# Patient Record
Sex: FEMALE | Race: WHITE | NOT HISPANIC OR LATINO | Employment: UNEMPLOYED | URBAN - METROPOLITAN AREA
[De-identification: names, ages, dates, MRNs, and addresses within clinical notes are randomized per-mention and may not be internally consistent; named-entity substitution may affect disease eponyms.]

---

## 2017-01-11 ENCOUNTER — HOSPITAL ENCOUNTER (OUTPATIENT)
Dept: RADIOLOGY | Facility: HOSPITAL | Age: 67
Discharge: HOME | End: 2017-01-11
Attending: NURSE PRACTITIONER
Payer: COMMERCIAL

## 2017-01-11 ENCOUNTER — OFFICE VISIT (OUTPATIENT)
Dept: FAMILY MEDICINE | Facility: CLINIC | Age: 67
End: 2017-01-11
Payer: COMMERCIAL

## 2017-01-11 VITALS
RESPIRATION RATE: 18 BRPM | SYSTOLIC BLOOD PRESSURE: 145 MMHG | OXYGEN SATURATION: 95 % | BODY MASS INDEX: 34.89 KG/M2 | WEIGHT: 189.63 LBS | DIASTOLIC BLOOD PRESSURE: 79 MMHG | HEART RATE: 66 BPM | HEIGHT: 62 IN | TEMPERATURE: 98 F

## 2017-01-11 DIAGNOSIS — R60.0 FACIAL EDEMA: ICD-10-CM

## 2017-01-11 DIAGNOSIS — K04.7 DENTAL ABSCESS: ICD-10-CM

## 2017-01-11 DIAGNOSIS — K02.9 PAIN DUE TO DENTAL CARIES: ICD-10-CM

## 2017-01-11 DIAGNOSIS — R60.0 FACIAL EDEMA: Primary | ICD-10-CM

## 2017-01-11 PROCEDURE — 1159F MED LIST DOCD IN RCRD: CPT | Mod: S$GLB,,, | Performed by: NURSE PRACTITIONER

## 2017-01-11 PROCEDURE — 3078F DIAST BP <80 MM HG: CPT | Mod: S$GLB,,, | Performed by: NURSE PRACTITIONER

## 2017-01-11 PROCEDURE — 3077F SYST BP >= 140 MM HG: CPT | Mod: S$GLB,,, | Performed by: NURSE PRACTITIONER

## 2017-01-11 PROCEDURE — 70150 X-RAY EXAM OF FACIAL BONES: CPT | Mod: TC,PO

## 2017-01-11 PROCEDURE — 99213 OFFICE O/P EST LOW 20 MIN: CPT | Mod: S$GLB,,, | Performed by: NURSE PRACTITIONER

## 2017-01-11 PROCEDURE — 99999 PR PBB SHADOW E&M-EST. PATIENT-LVL IV: CPT | Mod: PBBFAC,,, | Performed by: NURSE PRACTITIONER

## 2017-01-11 PROCEDURE — 70150 X-RAY EXAM OF FACIAL BONES: CPT | Mod: 26,,, | Performed by: RADIOLOGY

## 2017-01-11 RX ORDER — CHLORHEXIDINE GLUCONATE ORAL RINSE 1.2 MG/ML
15 SOLUTION DENTAL 2 TIMES DAILY
Qty: 118 ML | Refills: 0 | Status: SHIPPED | OUTPATIENT
Start: 2017-01-11 | End: 2017-01-25

## 2017-01-11 RX ORDER — AMOXICILLIN AND CLAVULANATE POTASSIUM 875; 125 MG/1; MG/1
1 TABLET, FILM COATED ORAL 2 TIMES DAILY
Qty: 20 TABLET | Refills: 0 | Status: SHIPPED | OUTPATIENT
Start: 2017-01-11 | End: 2017-01-21

## 2017-01-11 RX ORDER — METRONIDAZOLE 500 MG/1
500 TABLET ORAL 3 TIMES DAILY
Qty: 30 TABLET | Refills: 0 | Status: SHIPPED | OUTPATIENT
Start: 2017-01-11 | End: 2017-01-21

## 2017-01-11 RX ORDER — OXYCODONE AND ACETAMINOPHEN 5; 325 MG/1; MG/1
1 TABLET ORAL EVERY 8 HOURS PRN
Qty: 20 TABLET | Refills: 0 | Status: SHIPPED | OUTPATIENT
Start: 2017-01-11 | End: 2017-04-19

## 2017-01-11 NOTE — PROGRESS NOTES
Subjective:       Patient ID: Ruthann Swenson is a 66 y.o. female.    Chief Complaint: Dental Pain  Pt reports to clinic with chief complaint of dental pain and facial edema.  Dental pain started approx 2 days ago.  Pt reports awakening with facial edema to the left cheek.  Denies swelling to lips or tongue. No difficulty breathing.  Reports she is supposed to get teeth pulled soon to begin process of getting dentures.  Denies fever.   Dental Pain    This is a new problem. The current episode started yesterday. The problem occurs constantly. The problem has been unchanged. The pain is at a severity of 10/10. The pain is severe. Associated symptoms include facial pain. Pertinent negatives include no difficulty swallowing. She has tried nothing for the symptoms.     Review of Systems   Constitutional: Negative.    HENT: Positive for dental problem and facial swelling.    Respiratory: Negative for shortness of breath and wheezing.    Cardiovascular: Negative.    Psychiatric/Behavioral: Negative.        Objective:      Physical Exam   Constitutional: She is oriented to person, place, and time. She appears well-developed and well-nourished.   HENT:   Head: Normocephalic.   Right Ear: Tympanic membrane normal.   Left Ear: Tympanic membrane normal.   Mouth/Throat: Dental abscesses and dental caries present. No tonsillar abscesses.       Tender to touch, several caries noted.; edema noted to left jaw, no periorbital edema;    Eyes: EOM are normal. Pupils are equal, round, and reactive to light.   Neck: Neck supple.   Cardiovascular: Normal rate and normal heart sounds.    Pulmonary/Chest: Effort normal and breath sounds normal.   Lymphadenopathy:     She has no cervical adenopathy.   Neurological: She is alert and oriented to person, place, and time.   Skin: Skin is warm and dry.   Psychiatric: She has a normal mood and affect.   Vitals reviewed.      Assessment:       1. Facial edema    2. Dental abscess    3.  Pain due to dental caries        Plan:   Facial edema  -     amoxicillin-clavulanate 875-125mg (AUGMENTIN) 875-125 mg per tablet; Take 1 tablet by mouth 2 (two) times daily.  Dispense: 20 tablet; Refill: 0  -     metronidazole (FLAGYL) 500 MG tablet; Take 1 tablet (500 mg total) by mouth 3 (three) times daily.  Dispense: 30 tablet; Refill: 0  -     X-Ray Facial Bones  3 Or More View; Future; Expected date: 1/11/17  -     oxycodone-acetaminophen (PERCOCET) 5-325 mg per tablet; Take 1 tablet by mouth every 8 (eight) hours as needed for Pain.  Dispense: 20 tablet; Refill: 0    Dental abscess  -     chlorhexidine (PERIDEX) 0.12 % solution; Use as directed 15 mLs in the mouth or throat 2 (two) times daily.  Dispense: 118 mL; Refill: 0  -     amoxicillin-clavulanate 875-125mg (AUGMENTIN) 875-125 mg per tablet; Take 1 tablet by mouth 2 (two) times daily.  Dispense: 20 tablet; Refill: 0  -     metronidazole (FLAGYL) 500 MG tablet; Take 1 tablet (500 mg total) by mouth 3 (three) times daily.  Dispense: 30 tablet; Refill: 0  -     X-Ray Facial Bones  3 Or More View; Future; Expected date: 1/11/17  -     oxycodone-acetaminophen (PERCOCET) 5-325 mg per tablet; Take 1 tablet by mouth every 8 (eight) hours as needed for Pain.  Dispense: 20 tablet; Refill: 0  -pt instructed to follow up with dentist STAT.  States she will notify dentist that is pulling her teeth soon as she gets home.  Educated patient on worsening symptoms such as airway edema; verbalized understanding.   Pain due to dental caries  -     chlorhexidine (PERIDEX) 0.12 % solution; Use as directed 15 mLs in the mouth or throat 2 (two) times daily.  Dispense: 118 mL; Refill: 0  -     oxycodone-acetaminophen (PERCOCET) 5-325 mg per tablet; Take 1 tablet by mouth every 8 (eight) hours as needed for Pain.  Dispense: 20 tablet; Refill: 0      No Follow-up on file.

## 2017-01-11 NOTE — MR AVS SNAPSHOT
[facility]  [address]  [facility] [address]  Phone: [phone]  Fax: [phone]                  Ruthann Swenson   2017 10:20 AM   Office Visit    Description:  Female : 1950   Provider:  Loyda Alberts NP   Department:  [facility]           Reason for Visit     Dental Pain           Diagnoses this Visit        Comments    Facial edema    -  Primary     Dental abscess         Pain due to dental caries                To Do List           Future Appointments        Provider Department Dept Phone    2017 11:45 AM [facility] XR1 [facility] [phone]      Goals (5 Years of Data)     None       These Medications        Disp Refills Start End    chlorhexidine (PERIDEX) 0.12 % solution 118 mL 0 2017    Use as directed 15 mLs in the mouth or throat 2 (two) times daily. - Mouth/Throat    Pharmacy: [address] [place] - [address] [facility] #: [phone]       amoxicillin-clavulanate 875-125mg (AUGMENTIN) 875-125 mg per tablet 20 tablet 0 2017    Take 1 tablet by mouth 2 (two) times daily. - Oral    Pharmacy: [address] [place], [place] - [address] [facility] #: [phone]       metronidazole (FLAGYL) 500 MG tablet 30 tablet 0 2017    Take 1 tablet (500 mg total) by mouth 3 (three) times daily. - Oral    Pharmacy: [address] [place], [place] - [address] [facility] #: [phone]       oxycodone-acetaminophen (PERCOCET) 5-325 mg per tablet 20 tablet 0 2017     Take 1 tablet by mouth every 8 (eight) hours as needed for Pain. - Oral    Pharmacy: [address] [place], [place] - [address] [facility] #: [phone]         [facility]     [facility] On Call Nurse Care Line -  Assistance  Registered nurses in the [facility] On Call Center provide clinical advisement, health education, appointment booking, and other  advisory services.  Call for this free service at [phone].             Medications           Message regarding Medications     Verify the changes and/or additions to your medication regime listed below are the same as discussed with your clinician today.  If any of these changes or additions are incorrect, please notify your healthcare provider.        START taking these NEW medications        Refills    chlorhexidine (PERIDEX) 0.12 % solution 0    Sig: Use as directed 15 mLs in the mouth or throat 2 (two) times daily.    Class: Normal    Route: Mouth/Throat    amoxicillin-clavulanate 875-125mg (AUGMENTIN) 875-125 mg per tablet 0    Sig: Take 1 tablet by mouth 2 (two) times daily.    Class: Normal    Route: Oral    metronidazole (FLAGYL) 500 MG tablet 0    Sig: Take 1 tablet (500 mg total) by mouth 3 (three) times daily.    Class: Normal    Route: Oral    oxycodone-acetaminophen (PERCOCET) 5-325 mg per tablet 0    Sig: Take 1 tablet by mouth every 8 (eight) hours as needed for Pain.    Class: Print    Route: Oral           Verify that the below list of medications is an accurate representation of the medications you are currently taking.  If none reported, the list may be blank. If incorrect, please contact your healthcare provider. Carry this list with you in case of emergency.           Current Medications     albuterol 90 mcg/actuation inhaler Inhale 2 puffs into the lungs every 6 (six) hours as needed for Wheezing.    carvedilol (COREG) 12.5 MG tablet Take 1 tablet (12.5 mg total) by mouth 2 (two) times daily.    cetirizine (ZYRTEC) 10 MG tablet Take 10 mg by mouth once daily.    fluocinolone (VANOS) 0.01 % cream Apply topically 2 (two) times daily.    fluticasone (FLONASE) 50 mcg/actuation nasal spray 2 sprays by Each Nare route once daily.    levothyroxine (SYNTHROID) 125 MCG tablet Take 1 tablet (125 mcg total) by mouth once daily.    omeprazole (PRILOSEC) 20 MG capsule Take 1 capsule (20 mg total) by  "mouth once daily.    amoxicillin-clavulanate 875-125mg (AUGMENTIN) 875-125 mg per tablet Take 1 tablet by mouth 2 (two) times daily.    chlorhexidine (PERIDEX) 0.12 % solution Use as directed 15 mLs in the mouth or throat 2 (two) times daily.    metronidazole (FLAGYL) 500 MG tablet Take 1 tablet (500 mg total) by mouth 3 (three) times daily.    oxycodone-acetaminophen (PERCOCET) 5-325 mg per tablet Take 1 tablet by mouth every 8 (eight) hours as needed for Pain.           Clinical Reference Information           Vital Signs - Last Recorded  Most recent update: 1/11/2017 10:37 AM by Jaci Jha LPN    BP Pulse Temp Resp Ht Wt    (!) 145/79 66 98.1 °F (36.7 °C) (Tympanic) 18 5' 2" (1.575 m) 86 kg (189 lb 9.5 oz)    SpO2 BMI             95% 34.68 kg/m2         Blood Pressure          Most Recent Value    BP  (!)  145/79      Allergies as of 1/11/2017     Allegra [Fexofenadine]    Lortab [Hydrocodone-acetaminophen]    Prednisone      Immunizations Administered on Date of Encounter - 1/11/2017     None      Orders Placed During Today's Visit     Future Labs/Procedures Expected by Expires    X-Ray Facial Bones  3 Or More View  1/11/2017 1/11/2018      "

## 2017-02-22 DIAGNOSIS — I10 ESSENTIAL HYPERTENSION: ICD-10-CM

## 2017-02-22 RX ORDER — CARVEDILOL 12.5 MG/1
12.5 TABLET ORAL 2 TIMES DAILY
Qty: 60 TABLET | Refills: 2 | Status: SHIPPED | OUTPATIENT
Start: 2017-02-22 | End: 2017-06-27 | Stop reason: SDUPTHER

## 2017-02-22 NOTE — TELEPHONE ENCOUNTER
Last office visit 01/11/2017. Last refill date 01/15/2017. Pt is requesting a refill on coreg 12.5 mg #60 bid.

## 2017-04-19 ENCOUNTER — OFFICE VISIT (OUTPATIENT)
Dept: FAMILY MEDICINE | Facility: CLINIC | Age: 67
End: 2017-04-19
Payer: COMMERCIAL

## 2017-04-19 ENCOUNTER — HOSPITAL ENCOUNTER (OUTPATIENT)
Dept: RADIOLOGY | Facility: HOSPITAL | Age: 67
Discharge: HOME | End: 2017-04-19
Attending: FAMILY MEDICINE
Payer: COMMERCIAL

## 2017-04-19 VITALS
HEART RATE: 80 BPM | DIASTOLIC BLOOD PRESSURE: 72 MMHG | BODY MASS INDEX: 34.22 KG/M2 | TEMPERATURE: 98 F | HEIGHT: 62 IN | SYSTOLIC BLOOD PRESSURE: 118 MMHG | WEIGHT: 185.94 LBS | OXYGEN SATURATION: 94 %

## 2017-04-19 DIAGNOSIS — M25.462 SWELLING OF KNEE JOINT, LEFT: Primary | ICD-10-CM

## 2017-04-19 DIAGNOSIS — E78.5 DM TYPE 2 WITH DIABETIC DYSLIPIDEMIA: ICD-10-CM

## 2017-04-19 DIAGNOSIS — E11.69 DM TYPE 2 WITH DIABETIC DYSLIPIDEMIA: ICD-10-CM

## 2017-04-19 DIAGNOSIS — M25.462 SWELLING OF KNEE JOINT, LEFT: ICD-10-CM

## 2017-04-19 PROCEDURE — 99999 PR PBB SHADOW E&M-EST. PATIENT-LVL III: CPT | Mod: PBBFAC,,, | Performed by: FAMILY MEDICINE

## 2017-04-19 PROCEDURE — 99214 OFFICE O/P EST MOD 30 MIN: CPT | Mod: S$GLB,,, | Performed by: FAMILY MEDICINE

## 2017-04-19 PROCEDURE — 3078F DIAST BP <80 MM HG: CPT | Mod: S$GLB,,, | Performed by: FAMILY MEDICINE

## 2017-04-19 PROCEDURE — 73560 X-RAY EXAM OF KNEE 1 OR 2: CPT | Mod: TC,PO,RT

## 2017-04-19 PROCEDURE — 73560 X-RAY EXAM OF KNEE 1 OR 2: CPT | Mod: 26,RT,, | Performed by: RADIOLOGY

## 2017-04-19 PROCEDURE — 3060F POS MICROALBUMINURIA REV: CPT | Mod: 8P,S$GLB,, | Performed by: FAMILY MEDICINE

## 2017-04-19 PROCEDURE — 1160F RVW MEDS BY RX/DR IN RCRD: CPT | Mod: S$GLB,,, | Performed by: FAMILY MEDICINE

## 2017-04-19 PROCEDURE — 73562 X-RAY EXAM OF KNEE 3: CPT | Mod: 26,LT,, | Performed by: RADIOLOGY

## 2017-04-19 PROCEDURE — 3074F SYST BP LT 130 MM HG: CPT | Mod: S$GLB,,, | Performed by: FAMILY MEDICINE

## 2017-04-19 PROCEDURE — 3045F PR MOST RECENT HEMOGLOBIN A1C LEVEL 7.0-9.0%: CPT | Mod: S$GLB,,, | Performed by: FAMILY MEDICINE

## 2017-04-19 RX ORDER — MULTIVITAMIN
1 TABLET ORAL DAILY
COMMUNITY

## 2017-04-19 NOTE — MR AVS SNAPSHOT
[facility]  [address]  [facility] [address]  Phone: [phone]  Fax: [phone]                  Ruthann Swenson   2017 2:20 PM   Office Visit    Description:  Female : 1950   Provider:  Gifty Rivera MD   Department:  [facility]           Reason for Visit     Knee Pain           Diagnoses this Visit        Comments    Swelling of knee joint, left    -  Primary     DM type 2 with diabetic dyslipidemia                To Do List           Future Appointments        Provider Department Dept Phone    2017 3:30 PM [facility] XR1 [facility] [phone]      Goals (5 Years of Data)     None      [facility] On Call     [facility] Nurse Care Line -  Assistance  Unless otherwise directed by your provider, please contact [facility], our nurse care line that is available for  assistance.     Registered nurses in the [facility] Center provide: appointment scheduling, clinical advisement, health education, and other advisory services.  Call: [phone] (toll free)               Medications           Message regarding Medications     Verify the changes and/or additions to your medication regime listed below are the same as discussed with your clinician today.  If any of these changes or additions are incorrect, please notify your healthcare provider.        STOP taking these medications     oxycodone-acetaminophen (PERCOCET) 5-325 mg per tablet Take 1 tablet by mouth every 8 (eight) hours as needed for Pain.           Verify that the below list of medications is an accurate representation of the medications you are currently taking.  If none reported, the list may be blank. If incorrect, please contact your healthcare provider. Carry this list with you in case of emergency.           Current Medications     albuterol 90 mcg/actuation inhaler Inhale 2 puffs into the lungs every 6 (six) hours as  "needed for Wheezing.    carvedilol (COREG) 12.5 MG tablet Take 1 tablet (12.5 mg total) by mouth 2 (two) times daily.    cetirizine (ZYRTEC) 10 MG tablet Take 10 mg by mouth once daily.    fluticasone (FLONASE) 50 mcg/actuation nasal spray 2 sprays by Each Nare route once daily.    levothyroxine (SYNTHROID) 125 MCG tablet Take 1 tablet (125 mcg total) by mouth once daily.    multivitamin (ONE DAILY MULTIVITAMIN) per tablet Take 1 tablet by mouth once daily.    fluocinolone (VANOS) 0.01 % cream Apply topically 2 (two) times daily.    omeprazole (PRILOSEC) 20 MG capsule Take 1 capsule (20 mg total) by mouth once daily.           Clinical Reference Information           Your Vitals Were     BP Pulse Temp Height Weight SpO2    118/72 (BP Location: Left arm, Patient Position: Sitting, BP Method: Manual) 80 98.2 °F (36.8 °C) (Oral) 5' 2" (1.575 m) 84.4 kg (185 lb 15.3 oz) 94%    BMI                34.01 kg/m2          Blood Pressure          Most Recent Value    BP  118/72      Allergies as of 4/19/2017     Allegra [Fexofenadine]    Lortab [Hydrocodone-acetaminophen]    Prednisone      Immunizations Administered on Date of Encounter - 4/19/2017     None      Orders Placed During Today's Visit     Future Labs/Procedures Expected by Expires    CBC auto differential  4/19/2017 7/18/2017    Hemoglobin A1c  4/19/2017 6/18/2018    Sedimentation rate, manual  4/19/2017 7/18/2017    Uric acid  4/19/2017 7/18/2017    X-ray Knee Ortho Left  4/19/2017 4/19/2018      Language Assistance Services     ATTENTION: Language assistance services are available, free of charge. Please call [phone].      ATENCIÓN: Si debbiela rona, tiene a aguilar disposición servicios gratuitos de asistencia lingüística. Llame al [phone].     CHÚ Ý: N?u b?n nói Ti?ng Vi?t, có các d?ch v? h? tr? ngôn ng? mi?n phí dành cho b?n. G?i s? [phone].         [facility] complies with applicable Federal civil rights laws and does not " discriminate on the basis of race, color, national origin, age, disability, or sex.

## 2017-04-19 NOTE — PROGRESS NOTES
"Subjective:       Patient ID: Ruthann Swenson is a 66 y.o. female.    Chief Complaint: Knee Pain    HPI Comments: 66 y old female with L knee pain for 2 m , no injuries . She  states that 4 y ago she was trying to sit down and heard a " Squishing " sound and knee immediately swelled up . She had X ray done and she was told they were normal , now pain is excruciating . Mainly on medial aspect to L knee , worst when she turns knee and by stepping in certain kind of way , still swells up . She has not taken any med    Knee Pain        Review of Systems   Constitutional: Negative.    Respiratory: Negative.    Cardiovascular: Negative.    Gastrointestinal: Negative.    Musculoskeletal: Positive for arthralgias and joint swelling.       Objective:      Physical Exam   Constitutional: She is oriented to person, place, and time. She appears well-developed and well-nourished. No distress.   HENT:   Head: Normocephalic and atraumatic.   Right Ear: External ear normal.   Left Ear: External ear normal.   Mouth/Throat: No oropharyngeal exudate.   Eyes: Conjunctivae and EOM are normal. Pupils are equal, round, and reactive to light. Right eye exhibits no discharge. Left eye exhibits no discharge. No scleral icterus.   Neck: Normal range of motion. Neck supple. No JVD present. No tracheal deviation present. No thyromegaly present.   Cardiovascular: Normal rate, regular rhythm and normal heart sounds.  Exam reveals no gallop and no friction rub.    No murmur heard.  Pulmonary/Chest: Effort normal and breath sounds normal. No stridor. No respiratory distress. She has no wheezes. She has no rales. She exhibits no tenderness.   Abdominal: Soft. Bowel sounds are normal. She exhibits no distension. There is no tenderness. There is no rebound and no guarding.   Musculoskeletal:        Left knee: She exhibits decreased range of motion, swelling and effusion. Tenderness found. Medial joint line tenderness noted. "   Lymphadenopathy:     She has no cervical adenopathy.   Neurological: She is alert and oriented to person, place, and time.   Skin: Skin is warm and dry. She is not diaphoretic.   Psychiatric: She has a normal mood and affect. Her behavior is normal. Judgment and thought content normal.       Assessment:     Ruthann was seen today for knee pain.    Diagnoses and all orders for this visit:    Swelling of knee joint, left  -     X-ray Knee Ortho Left; Future  -     CBC auto differential; Future  -     Sedimentation rate, manual; Future  -     Uric acid; Future    DM type 2 with diabetic dyslipidemia  -     Hemoglobin A1c; Future      Plan:   We will cont to [facility]

## 2017-05-09 ENCOUNTER — LAB VISIT (OUTPATIENT)
Dept: LAB | Facility: HOSPITAL | Age: 67
End: 2017-05-09
Attending: FAMILY MEDICINE
Payer: COMMERCIAL

## 2017-05-09 ENCOUNTER — OFFICE VISIT (OUTPATIENT)
Dept: FAMILY MEDICINE | Facility: CLINIC | Age: 67
End: 2017-05-09
Payer: COMMERCIAL

## 2017-05-09 VITALS
OXYGEN SATURATION: 95 % | TEMPERATURE: 98 F | DIASTOLIC BLOOD PRESSURE: 70 MMHG | SYSTOLIC BLOOD PRESSURE: 128 MMHG | WEIGHT: 184.44 LBS | BODY MASS INDEX: 33.94 KG/M2 | HEIGHT: 62 IN | HEART RATE: 90 BPM

## 2017-05-09 DIAGNOSIS — M25.562 CHRONIC PAIN OF LEFT KNEE: ICD-10-CM

## 2017-05-09 DIAGNOSIS — G89.29 CHRONIC PAIN OF LEFT KNEE: ICD-10-CM

## 2017-05-09 DIAGNOSIS — K59.01 SLOW TRANSIT CONSTIPATION: ICD-10-CM

## 2017-05-09 DIAGNOSIS — G89.29 CHRONIC PAIN OF LEFT KNEE: Primary | ICD-10-CM

## 2017-05-09 DIAGNOSIS — E03.9 ACQUIRED HYPOTHYROIDISM: ICD-10-CM

## 2017-05-09 DIAGNOSIS — M25.562 CHRONIC PAIN OF LEFT KNEE: Primary | ICD-10-CM

## 2017-05-09 LAB
CREAT SERPL-MCNC: 0.8 MG/DL
EST. GFR  (AFRICAN AMERICAN): >60 ML/MIN/1.73 M^2
EST. GFR  (NON AFRICAN AMERICAN): >60 ML/MIN/1.73 M^2
T4 FREE SERPL-MCNC: 1.02 NG/DL
TSH SERPL DL<=0.005 MIU/L-ACNC: 5.53 UIU/ML

## 2017-05-09 PROCEDURE — 99999 PR PBB SHADOW E&M-EST. PATIENT-LVL III: CPT | Mod: PBBFAC,,, | Performed by: FAMILY MEDICINE

## 2017-05-09 PROCEDURE — 84443 ASSAY THYROID STIM HORMONE: CPT

## 2017-05-09 PROCEDURE — 36415 COLL VENOUS BLD VENIPUNCTURE: CPT | Mod: PO

## 2017-05-09 PROCEDURE — 99214 OFFICE O/P EST MOD 30 MIN: CPT | Mod: S$GLB,,, | Performed by: FAMILY MEDICINE

## 2017-05-09 PROCEDURE — 84439 ASSAY OF FREE THYROXINE: CPT

## 2017-05-09 PROCEDURE — 3078F DIAST BP <80 MM HG: CPT | Mod: S$GLB,,, | Performed by: FAMILY MEDICINE

## 2017-05-09 PROCEDURE — 82565 ASSAY OF CREATININE: CPT

## 2017-05-09 PROCEDURE — 3074F SYST BP LT 130 MM HG: CPT | Mod: S$GLB,,, | Performed by: FAMILY MEDICINE

## 2017-05-09 PROCEDURE — 1160F RVW MEDS BY RX/DR IN RCRD: CPT | Mod: S$GLB,,, | Performed by: FAMILY MEDICINE

## 2017-05-09 RX ORDER — POLYETHYLENE GLYCOL 3350 17 G/17G
POWDER, FOR SOLUTION ORAL
Qty: 100 PACKET | Refills: 0 | Status: SHIPPED | OUTPATIENT
Start: 2017-05-09 | End: 2017-06-30

## 2017-05-09 NOTE — PROGRESS NOTES
Subjective:       Patient ID: Ruthann Swenson is a 66 y.o. female.    Chief Complaint: Constipation and knot on nose    HPI Comments: 66 y old  Female with Constipation for the last 5 days . (very small caliber) .  She has been drinking water , eating plenty of vegetable  And it is still difficult . She is lactose intolerance and has up to 7 BM daily . Last colo was last y at [employer] .No fever , weight loss, nausea. anorexia     Constipation       Review of Systems   Constitutional: Negative.    Respiratory: Negative.    Gastrointestinal: Positive for constipation.       Objective:      Physical Exam   Constitutional: She is oriented to person, place, and time. She appears well-developed and well-nourished. No distress.   HENT:   Head: Normocephalic and atraumatic.   Right Ear: External ear normal.   Left Ear: External ear normal.   Mouth/Throat: No oropharyngeal exudate.   Eyes: Conjunctivae and EOM are normal. Pupils are equal, round, and reactive to light. Right eye exhibits no discharge. Left eye exhibits no discharge. No scleral icterus.   Neck: Normal range of motion. Neck supple. No JVD present. No tracheal deviation present. No thyromegaly present.   Cardiovascular: Normal rate, regular rhythm and normal heart sounds.  Exam reveals no gallop and no friction rub.    No murmur heard.  Pulmonary/Chest: Effort normal and breath sounds normal. No stridor. No respiratory distress. She has no wheezes. She has no rales. She exhibits no tenderness.   Abdominal: Soft. Bowel sounds are normal. She exhibits no distension. There is no tenderness. There is no rebound and no guarding.   Musculoskeletal: Normal range of motion.   Lymphadenopathy:     She has no cervical adenopathy.   Neurological: She is alert and oriented to person, place, and time.   Skin: Skin is warm and dry. She is not diaphoretic.   Psychiatric: She has a normal mood and affect. Her behavior is normal. Judgment and thought content normal.        Assessment:       Ruthann was seen today for constipation and knot on nose.    Diagnoses and all orders for this visit:    Chronic pain of left knee  -     MRI Lower Extremity Joint With Contrast Left; Future  -     Ambulatory consult to Orthopedics  -     Creatinine, serum; Future    Slow transit constipation    Acquired hypothyroidism  -     TSH; Future    Other orders  -     polyethylene glycol (GLYCOLAX) 17 gram PwPk; 1 capful dissolved in 8 ounces  of water  daily      Plan:   Will req col rec . F.u in 2 w . If it persists  will get colo

## 2017-05-09 NOTE — MR AVS SNAPSHOT
[facility]  [address]  [facility] [address]  Phone: [phone]  Fax: [phone]                  Ruthann Floreszpatrick   2017 2:40 PM   Office Visit    Description:  Female : 1950   Provider:  Gifty Rivera MD   Department:  [facility]           Reason for Visit     Constipation     knot on nose           Diagnoses this Visit        Comments    Chronic pain of left knee    -  Primary     Slow transit constipation         Acquired hypothyroidism                To Do List           Future Appointments        Provider Department Dept Phone    2017 2:30 PM [facility] - Orthopedics [phone]    2017 4:00 PM [facility] MRI1 [facility] -  [phone]      Goals (5 Years of Data)     None       These Medications        Disp Refills Start End    polyethylene glycol (GLYCOLAX) 17 gram PwPk 100 packet 0 2017     1 capful dissolved in 8 ounces  of water  daily    Pharmacy: [facility] [address] [place] [address] Ph #: [phone]         [facility]     [facility] Nurse Care Line -  Assistance  Unless otherwise directed by your provider, please contact [facility], our nurse care line that is available for  assistance.     Registered nurses in the [facility] provide: appointment scheduling, clinical advisement, health education, and other advisory services.  Call: [phone] (toll free)               Medications           Message regarding Medications     Verify the changes and/or additions to your medication regime listed below are the same as discussed with your clinician today.  If any of these changes or additions are incorrect, please notify your healthcare provider.        START taking these NEW medications        Refills    polyethylene glycol (GLYCOLAX) 17 gram PwPk 0    Si capful dissolved in 8 ounces  of water   "daily    Class: Normal           Verify that the below list of medications is an accurate representation of the medications you are currently taking.  If none reported, the list may be blank. If incorrect, please contact your healthcare provider. Carry this list with you in case of emergency.           Current Medications     albuterol 90 mcg/actuation inhaler Inhale 2 puffs into the lungs every 6 (six) hours as needed for Wheezing.    carvedilol (COREG) 12.5 MG tablet Take 1 tablet (12.5 mg total) by mouth 2 (two) times daily.    cetirizine (ZYRTEC) 10 MG tablet Take 10 mg by mouth once daily.    fluticasone (FLONASE) 50 mcg/actuation nasal spray 2 sprays by Each Nare route once daily.    levothyroxine (SYNTHROID) 125 MCG tablet Take 1 tablet (125 mcg total) by mouth once daily.    multivitamin (ONE DAILY MULTIVITAMIN) per tablet Take 1 tablet by mouth once daily.    fluocinolone (VANOS) 0.01 % cream Apply topically 2 (two) times daily.    omeprazole (PRILOSEC) 20 MG capsule Take 1 capsule (20 mg total) by mouth once daily.    polyethylene glycol (GLYCOLAX) 17 gram PwPk 1 capful dissolved in 8 ounces  of water  daily           Clinical Reference Information           Your Vitals Were     BP Pulse Temp Height Weight SpO2    128/70 (BP Location: Left arm, Patient Position: Sitting, BP Method: Manual) 90 98.2 °F (36.8 °C) (Oral) 5' 2" (1.575 m) 83.7 kg (184 lb 6.6 oz) 95%    BMI                33.73 kg/m2          Blood Pressure          Most Recent Value    BP  128/70      Allergies as of 5/9/2017     Allegra [Fexofenadine]    Lortab [Hydrocodone-acetaminophen]    Prednisone      Immunizations Administered on Date of Encounter - 5/9/2017     None      Orders Placed During Today's Visit      Normal Orders This Visit    Ambulatory consult to Orthopedics     Future Labs/Procedures Expected by Expires    Creatinine, serum  5/9/2017 7/8/2018    MRI Lower Extremity Joint With Contrast Left  5/9/2017 5/9/2018    TSH  " 5/9/2017 8/7/2017      Language Assistance Services     ATTENTION: Language assistance services are available, free of charge. Please call [phone].      ATENCIÓN: Si habla rona, tiene a aguilar disposición servicios gratuitos de asistencia lingüística. Llame al [phone].     CHÚ Ý: N?u b?n nói Ti?ng Vi?t, có các d?ch v? h? tr? ngôn ng? mi?n phí dành cho b?n. G?i s? [phone].         [facility] complies with applicable Federal civil rights laws and does not discriminate on the basis of race, color, national origin, age, disability, or sex.

## 2017-05-10 ENCOUNTER — TELEPHONE (OUTPATIENT)
Dept: FAMILY MEDICINE | Facility: CLINIC | Age: 67
End: 2017-05-10

## 2017-05-10 DIAGNOSIS — E03.9 ACQUIRED HYPOTHYROIDISM: ICD-10-CM

## 2017-05-10 RX ORDER — LEVOTHYROXINE SODIUM 137 UG/1
TABLET ORAL
Qty: 60 TABLET | Refills: 0 | Status: SHIPPED | OUTPATIENT
Start: 2017-05-10 | End: 2017-07-25 | Stop reason: SDUPTHER

## 2017-05-11 ENCOUNTER — HOSPITAL ENCOUNTER (OUTPATIENT)
Dept: RADIOLOGY | Facility: HOSPITAL | Age: 67
Discharge: HOME | End: 2017-05-11
Attending: FAMILY MEDICINE
Payer: COMMERCIAL

## 2017-05-11 ENCOUNTER — OFFICE VISIT (OUTPATIENT)
Dept: ORTHOPEDICS | Facility: CLINIC | Age: 67
End: 2017-05-11
Payer: COMMERCIAL

## 2017-05-11 DIAGNOSIS — M23.92 INTERNAL DERANGEMENT OF LEFT KNEE: Primary | ICD-10-CM

## 2017-05-11 DIAGNOSIS — G89.29 CHRONIC PAIN OF LEFT KNEE: ICD-10-CM

## 2017-05-11 DIAGNOSIS — M25.562 CHRONIC PAIN OF LEFT KNEE: ICD-10-CM

## 2017-05-11 DIAGNOSIS — M70.50 PES ANSERINE BURSITIS: ICD-10-CM

## 2017-05-11 PROCEDURE — 1160F RVW MEDS BY RX/DR IN RCRD: CPT | Mod: S$GLB,,, | Performed by: PHYSICIAN ASSISTANT

## 2017-05-11 PROCEDURE — 99204 OFFICE O/P NEW MOD 45 MIN: CPT | Mod: S$GLB,,, | Performed by: PHYSICIAN ASSISTANT

## 2017-05-11 PROCEDURE — 99999 PR PBB SHADOW E&M-EST. PATIENT-LVL II: CPT | Mod: PBBFAC,,, | Performed by: PHYSICIAN ASSISTANT

## 2017-05-11 PROCEDURE — 73721 MRI JNT OF LWR EXTRE W/O DYE: CPT | Mod: TC,LT

## 2017-05-11 NOTE — PROGRESS NOTES
Subjective:      Patient ID: Ruthann Swenson is a 66 y.o. female.    Chief Complaint: Pain of the Left Knee    HPI Comments: Body part: L knee    Occupation: previously a     Dominant hand: -    Referred by: Gifty Rivera, *    Date of Injury: 3 years ago    Patient's visit goal: to determine the cause of her pain./     Problem Description: pt has regular discomfort of the L knee and has for the past 3 years.  She recalls a pop several years ago and has had it checked on multiple occasions.  She's had multiple x-rays, but no MRI during this time.  She has tried multiple treatments independently to include ibuprofen, rest, heat, ice, and exercise.  She has pulling sensation in the posterior aspect. She has ttp in the medial aspect and finds it uncomfortable to touch- i.e at night she uses a pillow b/w her knees.  There is swelling on a regular basis.  There is instability of the knee as it often gives way.          Pain         ROS      Objective:            General    Nursing note and vitals reviewed.  Constitutional: She is oriented to person, place, and time. She appears well-developed and well-nourished. No distress.   Neck: Normal range of motion.   Neurological: She is alert and oriented to person, place, and time.   Psychiatric: She has a normal mood and affect. Her behavior is normal. Judgment and thought content normal.     General Musculoskeletal Exam   Gait: abnormal and antalgic     Right Ankle/Foot Exam     Alignment   Knee Alignment: varus    Left Ankle/Foot Exam     Alignment   Knee Alignment: varus    Right Knee Exam     Inspection   Erythema: absent  Swelling: absent  Effusion: effusion  Deformity: deformity  Bruising: absent    Range of Motion   The patient has normal right knee ROM.    Tests   Meniscus   Surjit:  Medial - negative Lateral - negative  Ligament Examination   MCL - Valgus: normal (0 to 2mm)  LCL - Varus: normal  Patella   Patellar Apprehension:  negative  Patellar Grind: negative    Other   Muscle Tightness: hamstring tightness  Sensation: normal    Left Knee Exam     Inspection   Erythema: absent  Scars: present (tibia)  Swelling: present  Effusion: absent  Deformity: deformity  Bruising: absent    Tenderness   The patient tender to palpation of the medial joint line and pes anserinus (popliteus).    Range of Motion   The patient has normal left knee ROM.    Tests   Meniscus   Surjit:  Medial - positive Lateral - negative  Stability   MCL - Valgus: normal (0 to 2mm)  LCL - Varus: normal (0 to 2mm)  Patella   Patellar Apprehension: negative  Patellar Grind: negative    Other   Muscle Tightness: hamstring tightness  Sensation: normalBack (L-Spine & T-Spine) / Neck (C-Spine) Exam     Back (L-Spine & T-Spine) Range of Motion   Extension: normal   Flexion: normal   Lateral Bend Right: normal   Lateral Bend Left: normal   Rotation Right: normal   Rotation Left: normal     Neck (C-Spine) Range of Motion   Flexion:     Normal  Extension: Normal  Right Lateral Bend: normal  Left Lateral Bend: normal  Right Rotation: normal  Left Rotation: normal      Muscle Strength   Right Lower Extremity   Hip Abduction: 4/5   Quadriceps:  5/5 and 4/5   Hamstrin/5 and 5/5   Left Lower Extremity   Hip Abduction: 4/5   Quadriceps:  4/5   Hamstrin/5     Vascular Exam       Edema  Right Lower Leg: absent  Left Lower Leg: absent    I have reviewed the films and report. I agree with the radiologist interpretation of the radiographic findings:  There is no radiographic evidence of acute osseous, articular, or soft tissue abnormality. There is mild joint space loss in the left medial tibiofemoral compartment.          Assessment:       Encounter Diagnoses   Name Primary?    Pes anserine bursitis     Chronic pain of left knee     Internal derangement of left knee- suspicious for MM tear Yes          Plan:       Ruthann was seen today for pain.    Diagnoses and all orders  for this visit:    Internal derangement of left knee    Pes anserine bursitis    Chronic pain of left knee    She is scheduled for MRI in the next hour.  I will attempt to phone her on Monday to discuss these findings that may lead to arthroscopic examination of the knee.  If no derangement is noted, she will participate in outpatient physical therapy program with [facility].    The patient understands, chooses and consents to this plan and accepts all   the risks which include but are not limited to the risks mentioned above.   Pt understands the alternative of having no testing, intervention or       treatment at this time. Pt left content and without questions.     Disclaimer: This note was prepared using a voice recognition system and is likely to have sound alike errors within the text.

## 2017-05-11 NOTE — LETTER
May 11, 2017      Gifty Rivera MD  [address] [address] [address]           [facility]  [address] [place] [id number]  Phone: [phone]  Fax: [phone]          Patient: Ruthann Swenson   MR Number: [id number]   YOB: 1950   Date of Visit: 5/11/2017       Dear Dr. Gifty Rivera:    Thank you for referring Ruthann Swenson to me for evaluation. Attached you will find relevant portions of my assessment and plan of care.    If you have questions, please do not hesitate to call me. I look forward to following Ruthann Swenson along with you.    Sincerely,    Gonzales Pastor PA-C    Enclosure  CC:  No Recipients    If you would like to receive this communication electronically, please contact [email or web address] or [phone] to request more information on [facility] access.    For providers and/or their staff who would like to refer a patient to [facility], please contact us through our one-stop-shop provider referral line, [facility], at [phone].    If you feel you have received this communication in error or would no longer like to receive these types of communications, please e-mail [email or web address]

## 2017-05-12 ENCOUNTER — TELEPHONE (OUTPATIENT)
Dept: FAMILY MEDICINE | Facility: CLINIC | Age: 67
End: 2017-05-12

## 2017-05-12 ENCOUNTER — PATIENT MESSAGE (OUTPATIENT)
Dept: FAMILY MEDICINE | Facility: CLINIC | Age: 67
End: 2017-05-12

## 2017-05-12 NOTE — TELEPHONE ENCOUNTER
----- Message from Millicent De León sent at 5/11/2017  1:49 PM CDT -----  Contact: self [phone]  States that she is returning call please call back at [phone]//thank you acc

## 2017-05-12 NOTE — TELEPHONE ENCOUNTER
----- Message from Chase Dawson sent at 5/12/2017  2:08 PM CDT -----  Pt is calling for MRI results. Pt can be reached at [phone] (home)

## 2017-05-12 NOTE — TELEPHONE ENCOUNTER
Returned call. Spoke with patient and informed [employer] had sent a message wanting to know if MRI was urgent due to patient account status and doctor stated that MRI is not urgent. Patient stated that she spoke with financial services yesterday 5/10/17.

## 2017-05-19 ENCOUNTER — TELEPHONE (OUTPATIENT)
Dept: ORTHOPEDICS | Facility: CLINIC | Age: 67
End: 2017-05-19

## 2017-05-19 NOTE — TELEPHONE ENCOUNTER
Duglas,   Ms Swenson needs a consult w/ Dr Lawson to discuss knee scope. I was unable to reach her to schedule.

## 2017-05-22 ENCOUNTER — TELEPHONE (OUTPATIENT)
Dept: ORTHOPEDICS | Facility: CLINIC | Age: 67
End: 2017-05-22

## 2017-05-24 ENCOUNTER — TELEPHONE (OUTPATIENT)
Dept: ORTHOPEDICS | Facility: CLINIC | Age: 67
End: 2017-05-24

## 2017-05-24 NOTE — TELEPHONE ENCOUNTER
----- Message from Nory Mckeon sent at 5/24/2017  2:20 PM CDT -----  Contact: Patient   Patient returned call, Please call her at [phone].    Thanks  td

## 2017-05-28 ENCOUNTER — HOSPITAL ENCOUNTER (EMERGENCY)
Facility: HOSPITAL | Age: 67
Discharge: HOME | End: 2017-05-28
Attending: INTERNAL MEDICINE
Payer: COMMERCIAL

## 2017-05-28 VITALS
BODY MASS INDEX: 33.68 KG/M2 | DIASTOLIC BLOOD PRESSURE: 80 MMHG | WEIGHT: 183 LBS | HEART RATE: 66 BPM | TEMPERATURE: 98 F | OXYGEN SATURATION: 98 % | RESPIRATION RATE: 18 BRPM | SYSTOLIC BLOOD PRESSURE: 158 MMHG | HEIGHT: 62 IN

## 2017-05-28 DIAGNOSIS — H81.12 BPV (BENIGN POSITIONAL VERTIGO), LEFT: ICD-10-CM

## 2017-05-28 DIAGNOSIS — M76.892 TENDONITIS OF KNEE, LEFT: Primary | ICD-10-CM

## 2017-05-28 PROCEDURE — 99283 EMERGENCY DEPT VISIT LOW MDM: CPT | Mod: 25

## 2017-05-28 PROCEDURE — 29505 APPLICATION LONG LEG SPLINT: CPT | Mod: LT

## 2017-05-28 PROCEDURE — 25000003 PHARM REV CODE 250: Performed by: INTERNAL MEDICINE

## 2017-05-28 PROCEDURE — 96372 THER/PROPH/DIAG INJ SC/IM: CPT

## 2017-05-28 PROCEDURE — 63600175 PHARM REV CODE 636 W HCPCS: Performed by: INTERNAL MEDICINE

## 2017-05-28 RX ORDER — MELOXICAM 7.5 MG/1
7.5 TABLET ORAL DAILY
Qty: 30 TABLET | Refills: 0 | Status: SHIPPED | OUTPATIENT
Start: 2017-05-28 | End: 2017-07-25

## 2017-05-28 RX ORDER — METHYLPREDNISOLONE 4 MG/1
TABLET ORAL
Qty: 1 PACKAGE | Refills: 0 | Status: SHIPPED | OUTPATIENT
Start: 2017-05-28 | End: 2017-06-18

## 2017-05-28 RX ORDER — MECLIZINE HYDROCHLORIDE 25 MG/1
25 TABLET ORAL
Status: COMPLETED | OUTPATIENT
Start: 2017-05-28 | End: 2017-05-28

## 2017-05-28 RX ORDER — KETOROLAC TROMETHAMINE 30 MG/ML
30 INJECTION, SOLUTION INTRAMUSCULAR; INTRAVENOUS
Status: COMPLETED | OUTPATIENT
Start: 2017-05-28 | End: 2017-05-28

## 2017-05-28 RX ADMIN — KETOROLAC TROMETHAMINE 30 MG: 30 INJECTION, SOLUTION INTRAMUSCULAR at 02:05

## 2017-05-28 RX ADMIN — MECLIZINE HYDROCHLORIDE 25 MG: 25 TABLET ORAL at 02:05

## 2017-05-28 NOTE — ED PROVIDER NOTES
"SCRIBE #1 NOTE: I, Rekha Morley, am scribing for, and in the presence of, José Luis Garcias MD. I have scribed the entire note.      History      Chief Complaint   Patient presents with    Dizziness     Patient c/o dizziness and N/V that started yesterday        Review of patient's allergies indicates:   Allergen Reactions    Allegra [fexofenadine]      Increase blood pressure     Lortab [hydrocodone-acetaminophen] Swelling    Prednisone Nausea And Vomiting        HPI   HPI    5/28/2017, 2:27 PM   History obtained from the patient      History of Present Illness: Ruthann Swenson is a 66 y.o. female patient who has MHx of HTN presents to the Emergency Department for dizziness which onset gradually 1 day ago. Pt states she woke up yesterday and felt like the room was "spinning". Pt reports waking up again this morning with similar sxs. Symptoms are constant and moderate in severity. No mitigating or exacerbating factors reported. Associated sxs include nausea and emesis. Patient denies any HA, rhinorrhea, postnasal drip, fever, chills, neck pain/stiffness, extremity weakness/numbness, photophobia, visual disturbance, and all other sxs at this time. Pt also reports having a torn meniscus in LLE which she is seeing PCP for. Pt reports twisting LLE when getting out of car 2 days ago. Pt reports not taking HTN medication today due to not being able to tolerate PO. No further complaints or concerns at this time.         Arrival mode: Personal vehicle     PCP: Gifty Rivera MD       Past Medical History:  Past Medical History:   Diagnosis Date    GERD (gastroesophageal reflux disease)     Hypertension     Thyroid disease        Past Surgical History:  Reviewed, not pertinent.       Family History:  Family History   Problem Relation Age of Onset    Dementia Mother     Cancer Mother      Kidney??    Diabetes Mother     Cancer Father      Lung     Heart disease Maternal Aunt      Atheroscelrosis/ " chf    Cancer Maternal Aunt      Lung Ca    Cancer Maternal Uncle      Lung Ca    Heart disease Maternal Grandmother        Social History:  Social History     Social History Main Topics    Smoking status: Former Smoker     Packs/day: 0.25     Years: 1.00    Smokeless tobacco: Not given    Alcohol use No    Drug use: No    Sexual activity: Yes       ROS   Review of Systems   Constitutional: Negative for chills and fever.   HENT: Negative for postnasal drip, rhinorrhea and sore throat.    Eyes: Negative for photophobia and visual disturbance.   Respiratory: Negative for shortness of breath.    Cardiovascular: Negative for chest pain.   Gastrointestinal: Positive for nausea and vomiting.   Genitourinary: Negative for dysuria.   Musculoskeletal: Positive for arthralgias (L knee). Negative for back pain, neck pain and neck stiffness.   Skin: Negative for rash.   Neurological: Positive for dizziness. Negative for weakness, numbness and headaches.   Hematological: Does not bruise/bleed easily.   All other systems reviewed and are negative.    Physical Exam      Initial Vitals [05/28/17 1411]   BP Pulse Resp Temp SpO2   (!) 170/84 66 18 97.6 °F (36.4 °C) 97 %      Physical Exam  Nursing Notes and Vital Signs Reviewed.  Constitutional: Patient is in no apparent distress. Well-developed and well-nourished.  Head: Atraumatic. Normocephalic.  Eyes: PERRL. EOM intact. Conjunctivae are not pale. No scleral icterus.  ENT: Mucous membranes are moist. Oropharynx is clear and symmetric. Fluid in L ear.  Neck: Supple. Full ROM. No lymphadenopathy.  Cardiovascular: Regular rate. Regular rhythm. No murmurs, rubs, or gallops. Distal pulses are 2+ and symmetric.  Pulmonary/Chest: No respiratory distress. Clear to auscultation bilaterally. No wheezing, rales, or rhonchi.  Abdominal: Soft and non-distended.  There is no tenderness.  No rebound, guarding, or rigidity. Good bowel sounds.  Genitourinary: No CVA  "tenderness  Musculoskeletal: Moves all extremities. No obvious deformities. No edema. No calf tenderness. Circumduction gait. L knee tenderness on lateral ligament side and posterior.   Skin: Warm and dry.  Neurological:  Alert, awake, and appropriate.  Normal speech.  No acute focal neurological deficits are appreciated.  Psychiatric: Normal affect. Good eye contact. Appropriate in content.    ED Course    Orthopedic Injury  Date/Time: 2017 2:49 PM  Authorized by: SAHARA HERNANDEZ   Performed by: SAHARA HERNANDEZ  Consent Done: Yes  Consent: Verbal consent obtained.  Risks and benefits: risks, benefits and alternatives were discussed  Consent given by: patient  Patient understanding: patient states understanding of the procedure being performed  Patient consent: the patient's understanding of the procedure matches consent given  Procedure consent: procedure consent matches procedure scheduled  Relevant documents: relevant documents present and verified  Patient identity confirmed:  and name  Injury location: knee  Location details: left knee  Pre-procedure distal perfusion: normal  Pre-procedure neurological function: normal  Pre-procedure neurovascular assessment: neurovascularly intact  Pre-procedure range of motion: reduced  Local anesthesia used: no    Anesthesia:  Local anesthesia used: no  Sedation:  Patient sedated: no    Immobilization: brace  Complications: No  Specimens: No  Implants: No  Post-procedure neurovascular assessment: post-procedure neurovascularly intact  Patient tolerance: Patient tolerated the procedure well with no immediate complications        ED Vital Signs:  Vitals:    17 1411 17 1430   BP: (!) 170/84    Pulse: 66 66   Resp: 18    Temp: 97.6 °F (36.4 °C)    TempSrc: Oral    SpO2: 97%    Weight: 83 kg (183 lb)    Height: 5' 2" (1.575 m)             The Emergency Provider reviewed the vital signs and test results, which are outlined above.    ED Discussion "   2:28 PM: Re-evaluated pt. Pt's sxs have improved following epley video. D/w pt any concerns expressed at this time. Answered all questions. Pt expresses understanding at this time.    2:36 PM: Reassessed pt at this time.  Pt states her condition has improved at this time. Discussed with pt all pertinent ED information and results. Discussed pt dx and plan of tx. Gave pt all f/u and return to the ED instructions. All questions and concerns were addressed at this time. Pt expresses understanding of information and instructions, and is comfortable with plan to discharge. Pt is stable for discharge.    I discussed with patient and/or family/caretaker that evaluation in the ED does not suggest any emergent or life threatening medical conditions requiring immediate intervention beyond what was provided in the ED, and I believe patient is safe for discharge.  Regardless, an unremarkable evaluation in the ED does not preclude the development or presence of a serious of life threatening condition. As such, patient was instructed to return immediately for any worsening or change in current symptoms.      ED Medication(s):  Medications   ketorolac injection 30 mg (not administered)   meclizine tablet 25 mg (25 mg Oral Given 5/28/17 1436)       New Prescriptions    MELOXICAM (MOBIC) 7.5 MG TABLET    Take 1 tablet (7.5 mg total) by mouth once daily.    METHYLPREDNISOLONE (MEDROL DOSEPACK) 4 MG TABLET    As directed       Follow-up Information     Go to  [facility].    Specialty:  Emergency Medicine  Why:  If symptoms worsen  Contact information:  [address] [facility] [address] [phone]           Melonie Owens, CCC-A. Schedule an appointment as soon as possible for a visit in 2 days.    Specialty:  Audiology  Why:  for vertigo  Contact information:  [id number] [place] [address] [address] FLO[address]  [facility] [address] [phone]                     Medical Decision Making               Scribe Attestation:   Scribe #1: I performed the above scribed service and the documentation accurately describes the services I performed. I attest to the accuracy of the note.    Attending:   Physician Attestation Statement for Scribe #1: I, José Luis Garcias MD, personally performed the services described in this documentation, as scribed by Rekha Morley, in my presence, and it is both accurate and complete.          Clinical Impression       ICD-10-CM ICD-9-CM   1. Tendonitis of knee, left M76.892 727.09   2. BPV (benign positional vertigo), left H81.12 386.11       Disposition:   Disposition: Discharged  Condition: Stable         José Luis Garcias MD  05/28/17 1930

## 2017-06-27 DIAGNOSIS — I10 ESSENTIAL HYPERTENSION: ICD-10-CM

## 2017-06-28 RX ORDER — CARVEDILOL 12.5 MG/1
TABLET ORAL
Qty: 60 TABLET | Refills: 0 | Status: SHIPPED | OUTPATIENT
Start: 2017-06-28 | End: 2017-07-25 | Stop reason: SDUPTHER

## 2017-06-30 ENCOUNTER — LAB VISIT (OUTPATIENT)
Dept: LAB | Facility: HOSPITAL | Age: 67
End: 2017-06-30
Attending: ORTHOPAEDIC SURGERY
Payer: COMMERCIAL

## 2017-06-30 ENCOUNTER — OFFICE VISIT (OUTPATIENT)
Dept: ORTHOPEDICS | Facility: CLINIC | Age: 67
End: 2017-06-30
Payer: COMMERCIAL

## 2017-06-30 VITALS — DIASTOLIC BLOOD PRESSURE: 85 MMHG | HEART RATE: 62 BPM | RESPIRATION RATE: 12 BRPM | SYSTOLIC BLOOD PRESSURE: 179 MMHG

## 2017-06-30 DIAGNOSIS — M17.12 ARTHRITIS OF KNEE, LEFT: Primary | ICD-10-CM

## 2017-06-30 DIAGNOSIS — S83.232A COMPLEX TEAR OF MEDIAL MENISCUS OF LEFT KNEE AS CURRENT INJURY, INITIAL ENCOUNTER: ICD-10-CM

## 2017-06-30 DIAGNOSIS — B99.9 INFECTION: ICD-10-CM

## 2017-06-30 DIAGNOSIS — B99.9 INFECTION: Primary | ICD-10-CM

## 2017-06-30 DIAGNOSIS — S83.282S TEAR OF LATERAL MENISCUS OF LEFT KNEE, CURRENT, UNSPECIFIED TEAR TYPE, SEQUELA: ICD-10-CM

## 2017-06-30 LAB
ALBUMIN SERPL BCP-MCNC: 3.8 G/DL
ALP SERPL-CCNC: 78 U/L
ALT SERPL W/O P-5'-P-CCNC: 20 U/L
ANION GAP SERPL CALC-SCNC: 11 MMOL/L
AST SERPL-CCNC: 22 U/L
BASOPHILS # BLD AUTO: 0.04 K/UL
BASOPHILS NFR BLD: 0.5 %
BILIRUB SERPL-MCNC: 0.6 MG/DL
BUN SERPL-MCNC: 14 MG/DL
CALCIUM SERPL-MCNC: 9.2 MG/DL
CHLORIDE SERPL-SCNC: 103 MMOL/L
CO2 SERPL-SCNC: 25 MMOL/L
CREAT SERPL-MCNC: 0.8 MG/DL
CRP SERPL-MCNC: 4.2 MG/L
DIFFERENTIAL METHOD: ABNORMAL
EOSINOPHIL # BLD AUTO: 0.2 K/UL
EOSINOPHIL NFR BLD: 2.9 %
ERYTHROCYTE [DISTWIDTH] IN BLOOD BY AUTOMATED COUNT: 13.7 %
ERYTHROCYTE [SEDIMENTATION RATE] IN BLOOD BY WESTERGREN METHOD: 14 MM/HR
EST. GFR  (AFRICAN AMERICAN): >60 ML/MIN/1.73 M^2
EST. GFR  (NON AFRICAN AMERICAN): >60 ML/MIN/1.73 M^2
GLUCOSE SERPL-MCNC: 160 MG/DL
HCT VFR BLD AUTO: 42.3 %
HGB BLD-MCNC: 14.3 G/DL
LYMPHOCYTES # BLD AUTO: 2.3 K/UL
LYMPHOCYTES NFR BLD: 31.3 %
MCH RBC QN AUTO: 31.4 PG
MCHC RBC AUTO-ENTMCNC: 33.8 %
MCV RBC AUTO: 93 FL
MONOCYTES # BLD AUTO: 0.7 K/UL
MONOCYTES NFR BLD: 9.8 %
NEUTROPHILS # BLD AUTO: 4.1 K/UL
NEUTROPHILS NFR BLD: 55.5 %
PLATELET # BLD AUTO: 260 K/UL
PMV BLD AUTO: 11.2 FL
POTASSIUM SERPL-SCNC: 4.2 MMOL/L
PROT SERPL-MCNC: 7.5 G/DL
RBC # BLD AUTO: 4.56 M/UL
RHEUMATOID FACT SERPL-ACNC: <10 IU/ML
SODIUM SERPL-SCNC: 139 MMOL/L
WBC # BLD AUTO: 7.35 K/UL

## 2017-06-30 PROCEDURE — 99214 OFFICE O/P EST MOD 30 MIN: CPT | Mod: S$GLB,,, | Performed by: ORTHOPAEDIC SURGERY

## 2017-06-30 PROCEDURE — 80053 COMPREHEN METABOLIC PANEL: CPT

## 2017-06-30 PROCEDURE — 86431 RHEUMATOID FACTOR QUANT: CPT

## 2017-06-30 PROCEDURE — 86140 C-REACTIVE PROTEIN: CPT

## 2017-06-30 PROCEDURE — 86038 ANTINUCLEAR ANTIBODIES: CPT

## 2017-06-30 PROCEDURE — 36415 COLL VENOUS BLD VENIPUNCTURE: CPT

## 2017-06-30 PROCEDURE — 85651 RBC SED RATE NONAUTOMATED: CPT

## 2017-06-30 PROCEDURE — 85025 COMPLETE CBC W/AUTO DIFF WBC: CPT

## 2017-06-30 PROCEDURE — 99999 PR PBB SHADOW E&M-EST. PATIENT-LVL III: CPT | Mod: PBBFAC,,, | Performed by: ORTHOPAEDIC SURGERY

## 2017-06-30 RX ORDER — MELOXICAM 15 MG/1
15 TABLET ORAL DAILY
Qty: 30 TABLET | Refills: 2 | Status: SHIPPED | OUTPATIENT
Start: 2017-06-30 | End: 2018-06-12

## 2017-07-03 LAB — ANA SER QL IF: NORMAL

## 2017-07-16 PROBLEM — S83.232A COMPLEX TEAR OF MEDIAL MENISCUS OF LEFT KNEE AS CURRENT INJURY: Status: ACTIVE | Noted: 2017-07-16

## 2017-07-16 PROBLEM — S83.282A TEAR OF LATERAL MENISCUS OF LEFT KNEE, CURRENT: Status: ACTIVE | Noted: 2017-07-16

## 2017-07-16 NOTE — PROGRESS NOTES
Subjective:      Patient ID: Ruthann Swenson is a 66 y.o. female.    Chief Complaint: No chief complaint on file.    HPI: The patient is seen in follow-up for her left knee.  She states that she is in fairly more comfortable since starting Mobic treatment.  She had an MRI scan that shows a torn medial and lateral meniscus as well as peripheral meniscal cysts and evolving arthritis.  Today she rates her level of discomfort at 0 on a pain scale of 1-10 when taking Mobic.    Review of Systems   Constitution: Negative for decreased appetite, fever, weight gain and weight loss.   HENT: Negative for ear pain, hearing loss, hoarse voice and sore throat.    Musculoskeletal: Positive for arthritis and joint pain.   Gastrointestinal: Negative.  Negative for constipation, diarrhea, nausea and vomiting.         Objective:            General    Constitutional: She is oriented to person, place, and time. She appears well-developed and well-nourished.   HENT:   Head: Normocephalic.   Nose: Nose normal.   Eyes: EOM are normal. Pupils are equal, round, and reactive to light.   Neck: Normal range of motion. Neck supple.   Cardiovascular: Normal rate and regular rhythm.    Pulmonary/Chest: Effort normal.   Abdominal: Soft. She exhibits no distension. There is no tenderness.   Neurological: She is alert and oriented to person, place, and time.   Psychiatric: She has a normal mood and affect. Her behavior is normal.             Left Knee Exam     Tenderness   The patient tender to palpation of the patella, condyle and medial joint line.    Crepitus   The patient has crepitus of the patella and MFC.    Tests   Meniscus   Surjit:  Medial - positive     Other   Meniscal Cyst: present  Sensation: normal    Vascular Exam       Left Pulses  Dorsalis Pedis:      2+  Posterior Tibial:      2+                      X-RAY:     MRI LOWER EXTREMITY JOINT WITHOUT CONTRAST LEFT    Clinical Indication:  M25.562 Pain in left knee; G89.29  Other chronic pain.  Suspected medial meniscus tear.       Technique: Axial, sagittal and coronal images of the left knee were obtained utilizing multiple pulse sequences without the use of IV contrast.    Comparison: No prior knee MRI.    Findings: There is a horizontal tear of the medial meniscus posterior root with mild adjacent reactive subchondral marrow edema and degenerative cystic change of the posterior medial tibial plateau.  There is a 4 mm adjacent parameniscal cyst communicating with the tear.  There is adjacent T2 hyperintense signal and synovial thickening along the midline posterior femorotibial joint capsule dorsal to a normal-appearing PCL, possibly synovitis or posterior capsular sprain.  The is mild degenerative free edge tearing of the medial meniscus posterior horn.  There is a horizontal to oblique undersurface tear involving the posterior aspect of the medial meniscus body and posterior junctional zone with mild extension into the adjacent posterior horn.  There appears be a small free edge radial type tear of the lateral meniscus body (coronal PD images 15-16 series 4).  No other obvious meniscal tear is identified.    There is a trace joint effusion and tiny Baker cyst. No fracture, osteochondral defect or loose intra-articular osteochondral body is seen.  Joint alignment is anatomic.  There is a small focus of high-grade chondral loss involving the medial femoral condyle central weightbearing surface.  There is low to moderate chondral thinning throughout the remainder of the medial compartment.  There is a small focus of high-grade chondral loss involving the lateral femoral condyle posterior weightbearing surface.  The remainder of the lateral femorotibial articular can't appears relatively well maintained.  There is low-grade chondral thinning and fissuring of the patella and ventricular groove.    The ACL, PCL, MCL, LCL, posterolateral corner structures, extensor mechanism, patellar  retinaculum and remaining supporting soft tissue structures of the knee are intact.   Impression     1.  Medial and lateral meniscus tears, as above.  Small parameniscal cyst and reactive marrow edema adjacent to the medial meniscus posterior root tear.  2.  Midline posterior femorotibial capsular sprain versus synovitis.  3.  Small foci of high-grade chondral loss of the medial and lateral femoral condyles.  Moderate grade chondral degeneration of the remaining medial compartment.  Low-grade patellofemoral chondromalacia.  Tiny Baker's cyst.         Technique: AP standing views of both knees as well as lateral and Merchant views of the left knee and a Merchant view of the right knee were obtained.    Comparison: none    Findings: There is no radiographic evidence of acute osseous, articular, or soft tissue abnormality. There is mild joint space loss in the left medial tibiofemoral compartment.      Assessment:           Encounter Diagnosis   Name Primary?    Arthritis of knee, left Yes          Plan:     As the patient is comfortable when taking Mobic, we will observe her knee and obtain labs to work her up for inflammatory arthritis.  She'll be seen back in 4 weeks to review the results of her lab studies.  In the meantime the patient is going to determine whether or not she is at an acceptable unacceptable level of discomfort and function.  The patient will decide whether or not she wants to proceed with outpatient arthroscopic excision of torn medial and lateral menisci in her left knee and this will be discussed and scheduled if desired at the next office visit.

## 2017-07-25 ENCOUNTER — OFFICE VISIT (OUTPATIENT)
Dept: FAMILY MEDICINE | Facility: CLINIC | Age: 67
End: 2017-07-25
Payer: COMMERCIAL

## 2017-07-25 VITALS
OXYGEN SATURATION: 95 % | BODY MASS INDEX: 34.03 KG/M2 | WEIGHT: 184.94 LBS | HEIGHT: 62 IN | HEART RATE: 83 BPM | SYSTOLIC BLOOD PRESSURE: 116 MMHG | DIASTOLIC BLOOD PRESSURE: 64 MMHG | TEMPERATURE: 98 F

## 2017-07-25 DIAGNOSIS — I10 ESSENTIAL HYPERTENSION: ICD-10-CM

## 2017-07-25 DIAGNOSIS — E03.9 ACQUIRED HYPOTHYROIDISM: ICD-10-CM

## 2017-07-25 DIAGNOSIS — H65.23 BILATERAL CHRONIC SEROUS OTITIS MEDIA: Primary | ICD-10-CM

## 2017-07-25 PROCEDURE — 99999 PR PBB SHADOW E&M-EST. PATIENT-LVL III: CPT | Mod: PBBFAC,,, | Performed by: FAMILY MEDICINE

## 2017-07-25 PROCEDURE — 99213 OFFICE O/P EST LOW 20 MIN: CPT | Mod: S$GLB,,, | Performed by: FAMILY MEDICINE

## 2017-07-25 RX ORDER — AMOXICILLIN AND CLAVULANATE POTASSIUM 875; 125 MG/1; MG/1
1 TABLET, FILM COATED ORAL 2 TIMES DAILY
Qty: 20 TABLET | Refills: 0 | Status: SHIPPED | OUTPATIENT
Start: 2017-07-25 | End: 2017-08-04

## 2017-07-25 RX ORDER — CARVEDILOL 12.5 MG/1
TABLET ORAL
Qty: 60 TABLET | Refills: 0 | Status: SHIPPED | OUTPATIENT
Start: 2017-07-25 | End: 2017-09-08 | Stop reason: SDUPTHER

## 2017-07-25 RX ORDER — PREDNISONE 20 MG/1
TABLET ORAL
Qty: 20 TABLET | Refills: 0 | Status: SHIPPED | OUTPATIENT
Start: 2017-07-25 | End: 2017-10-09

## 2017-07-25 RX ORDER — LEVOTHYROXINE SODIUM 137 UG/1
TABLET ORAL
Qty: 60 TABLET | Refills: 0 | Status: SHIPPED | OUTPATIENT
Start: 2017-07-25 | End: 2018-03-13 | Stop reason: SDUPTHER

## 2017-07-25 NOTE — PROGRESS NOTES
Subjective:       Patient ID: Ruthann Lojatrick is a 66 y.o. female.    Chief Complaint: Otalgia    66 y old female with AR now with  l otalgia for  Months . Not constant . Stabbing at times .   No fever  No otorrhea. Taking IBP . Using Antihistaminic and INGC consistently       Otalgia        Review of Systems   Constitutional: Negative.    HENT: Positive for ear pain.    Respiratory: Negative.    Cardiovascular: Negative.    Gastrointestinal: Negative.        Objective:      Physical Exam   Constitutional: She is oriented to person, place, and time. She appears well-developed and well-nourished. No distress.   HENT:   Head: Normocephalic and atraumatic.   Right Ear: External ear normal. A middle ear effusion is present.   Left Ear: External ear normal. A middle ear effusion is present.   Mouth/Throat: No oropharyngeal exudate.   Eyes: Conjunctivae and EOM are normal. Pupils are equal, round, and reactive to light. Right eye exhibits no discharge. Left eye exhibits no discharge. No scleral icterus.   Neck: Normal range of motion. Neck supple. No JVD present. No tracheal deviation present. No thyromegaly present.   Cardiovascular: Normal rate, regular rhythm and normal heart sounds.  Exam reveals no gallop and no friction rub.    No murmur heard.  Pulmonary/Chest: Effort normal and breath sounds normal. No stridor. No respiratory distress. She has no wheezes. She has no rales. She exhibits no tenderness.   Abdominal: Soft. Bowel sounds are normal. She exhibits no distension. There is no tenderness. There is no rebound and no guarding.   Musculoskeletal: Normal range of motion.   Lymphadenopathy:     She has no cervical adenopathy.   Neurological: She is alert and oriented to person, place, and time.   Skin: Skin is warm and dry. She is not diaphoretic.   Psychiatric: She has a normal mood and affect. Her behavior is normal. Judgment and thought content normal.       Assessment:      Ruthann was seen today  for otalgia.    Diagnoses and all orders for this visit:    Bilateral chronic serous otitis media    Other orders  -     predniSONE (DELTASONE) 20 MG tablet; Take 3 pills daily for 3 days, then 2 pills daily for 3 days, then 1 pill daily for 3 days, then 1/2 pill daily for 4 days.  -     amoxicillin-clavulanate 875-125mg (AUGMENTIN) 875-125 mg per tablet; Take 1 tablet by mouth 2 (two) times daily.      Plan:   Call or return to clinic prn if these symptoms worsen or fail to improve as anticipated.

## 2017-09-08 DIAGNOSIS — I10 ESSENTIAL HYPERTENSION: ICD-10-CM

## 2017-09-08 RX ORDER — CARVEDILOL 12.5 MG/1
TABLET ORAL
Qty: 60 TABLET | Refills: 0 | Status: SHIPPED | OUTPATIENT
Start: 2017-09-08 | End: 2017-10-02 | Stop reason: SDUPTHER

## 2017-09-29 DIAGNOSIS — I10 ESSENTIAL HYPERTENSION: ICD-10-CM

## 2017-09-29 RX ORDER — CARVEDILOL 12.5 MG/1
TABLET ORAL
Qty: 60 TABLET | Refills: 0 | Status: CANCELLED | OUTPATIENT
Start: 2017-09-29

## 2017-10-02 DIAGNOSIS — I10 ESSENTIAL HYPERTENSION: ICD-10-CM

## 2017-10-02 RX ORDER — CARVEDILOL 12.5 MG/1
12.5 TABLET ORAL 2 TIMES DAILY
Qty: 60 TABLET | Refills: 0 | Status: SHIPPED | OUTPATIENT
Start: 2017-10-02 | End: 2017-10-24 | Stop reason: SDUPTHER

## 2017-10-09 ENCOUNTER — HOSPITAL ENCOUNTER (OUTPATIENT)
Dept: RADIOLOGY | Facility: HOSPITAL | Age: 67
Discharge: HOME | End: 2017-10-09
Attending: FAMILY MEDICINE
Payer: MEDICARE

## 2017-10-09 ENCOUNTER — OFFICE VISIT (OUTPATIENT)
Dept: FAMILY MEDICINE | Facility: CLINIC | Age: 67
End: 2017-10-09
Payer: MEDICARE

## 2017-10-09 VITALS
OXYGEN SATURATION: 96 % | BODY MASS INDEX: 34.29 KG/M2 | WEIGHT: 186.31 LBS | HEIGHT: 62 IN | SYSTOLIC BLOOD PRESSURE: 134 MMHG | HEART RATE: 86 BPM | DIASTOLIC BLOOD PRESSURE: 72 MMHG | TEMPERATURE: 99 F

## 2017-10-09 DIAGNOSIS — R06.02 SOB (SHORTNESS OF BREATH): Primary | ICD-10-CM

## 2017-10-09 DIAGNOSIS — R06.02 SOB (SHORTNESS OF BREATH): ICD-10-CM

## 2017-10-09 DIAGNOSIS — J01.00 ACUTE NON-RECURRENT MAXILLARY SINUSITIS: ICD-10-CM

## 2017-10-09 PROCEDURE — 71020 XR CHEST PA AND LATERAL: CPT | Mod: 26,,, | Performed by: RADIOLOGY

## 2017-10-09 PROCEDURE — 99214 OFFICE O/P EST MOD 30 MIN: CPT | Mod: S$PBB,,, | Performed by: FAMILY MEDICINE

## 2017-10-09 PROCEDURE — 71020 XR CHEST PA AND LATERAL: CPT | Mod: TC,PO

## 2017-10-09 PROCEDURE — 99999 PR PBB SHADOW E&M-EST. PATIENT-LVL III: CPT | Mod: PBBFAC,,, | Performed by: FAMILY MEDICINE

## 2017-10-09 PROCEDURE — 99213 OFFICE O/P EST LOW 20 MIN: CPT | Mod: PBBFAC,25,PO | Performed by: FAMILY MEDICINE

## 2017-10-09 RX ORDER — AMOXICILLIN AND CLAVULANATE POTASSIUM 875; 125 MG/1; MG/1
1 TABLET, FILM COATED ORAL 2 TIMES DAILY
Qty: 20 TABLET | Refills: 0 | Status: SHIPPED | OUTPATIENT
Start: 2017-10-09 | End: 2017-10-19

## 2017-10-09 RX ORDER — BENZONATATE 200 MG/1
200 CAPSULE ORAL 3 TIMES DAILY PRN
Qty: 20 CAPSULE | Refills: 0 | Status: SHIPPED | OUTPATIENT
Start: 2017-10-09 | End: 2017-10-19

## 2017-10-09 NOTE — PROGRESS NOTES
Subjective:       Patient ID: Ruthann Swenson is a 67 y.o. female.    Chief Complaint: Cough and Back Pain    67 y old female  With  rhinorrhea and productive cough for the last 9 days .  NO fever .  + SOB .  Not taking any meds   Aside from her regular meds . + Left side  CP when she takes a big breath       Cough   This is a new problem. The current episode started in the past 7 days. The problem has been gradually improving. The problem occurs hourly. The cough is non-productive. Associated symptoms include myalgias and rhinorrhea. She has tried cool air for the symptoms. The treatment provided mild relief. Her past medical history is significant for bronchitis.     Review of Systems   HENT: Positive for rhinorrhea and sinus pain.    Eyes: Negative.    Respiratory: Positive for cough.    Cardiovascular: Negative.    Gastrointestinal: Negative.    Musculoskeletal: Positive for myalgias.       Objective:      Physical Exam   Constitutional: She is oriented to person, place, and time. She appears well-developed and well-nourished. No distress.   HENT:   Head: Normocephalic and atraumatic.   Right Ear: External ear normal.   Left Ear: External ear normal.   Mouth/Throat: No oropharyngeal exudate.   Eyes: Conjunctivae and EOM are normal. Pupils are equal, round, and reactive to light. Right eye exhibits no discharge. Left eye exhibits no discharge. No scleral icterus.   Neck: Normal range of motion. Neck supple. No JVD present. No tracheal deviation present. No thyromegaly present.   Cardiovascular: Normal rate, regular rhythm and normal heart sounds.  Exam reveals no gallop and no friction rub.    No murmur heard.  Pulmonary/Chest: Effort normal. No stridor. No respiratory distress. She has no wheezes. She has rales in the left lower field. She exhibits no tenderness.   Abdominal: Soft. Bowel sounds are normal. She exhibits no distension. There is no tenderness. There is no rebound and no guarding.    Musculoskeletal: Normal range of motion.   Lymphadenopathy:     She has no cervical adenopathy.   Neurological: She is alert and oriented to person, place, and time.   Skin: Skin is warm and dry. She is not diaphoretic.   Psychiatric: She has a normal mood and affect. Her behavior is normal. Judgment and thought content normal.       Assessment:       Ruthann was seen today for cough and back pain.    Diagnoses and all orders for this visit:    SOB (shortness of breath)  -     X-Ray Chest PA And Lateral; Future    Acute non-recurrent maxillary sinusitis    Other orders  -     Cancel: DXA Bone Density Spine And Hip; Future  -     Cancel: Microalbumin/creatinine urine ratio; Future  -     amoxicillin-clavulanate 875-125mg (AUGMENTIN) 875-125 mg per tablet; Take 1 tablet by mouth 2 (two) times daily.  -     benzonatate (TESSALON) 200 MG capsule; Take 1 capsule (200 mg total) by mouth 3 (three) times daily as needed for Cough.      Plan:     Ruthann was seen today for cough and back pain.    Diagnoses and all orders for this visit:    Bone disorder  -     DXA Bone Density Spine And Hip; Future    Controlled type 2 diabetes mellitus with complication, without long-term current use of insulin  -     Microalbumin/creatinine urine ratio; Future     Call or return to clinic prn if these symptoms worsen or fail to improve as anticipated.

## 2017-10-24 DIAGNOSIS — I10 ESSENTIAL HYPERTENSION: ICD-10-CM

## 2017-10-24 RX ORDER — CARVEDILOL 12.5 MG/1
TABLET ORAL
Qty: 60 TABLET | Refills: 0 | Status: SHIPPED | OUTPATIENT
Start: 2017-10-24 | End: 2017-11-22 | Stop reason: SDUPTHER

## 2017-11-09 ENCOUNTER — PATIENT OUTREACH (OUTPATIENT)
Dept: ADMINISTRATIVE | Facility: HOSPITAL | Age: 67
End: 2017-11-09

## 2017-11-09 NOTE — PROGRESS NOTES
I spoke with pt that stated she just got her medicare and supplement insurance. Pt stated she was waiting for her insurance to get her test done. Pt will set up test next week. Pt verbalized understanding of appts.

## 2017-11-10 DIAGNOSIS — E11.9 TYPE 2 DIABETES MELLITUS WITHOUT COMPLICATION: ICD-10-CM

## 2017-11-22 DIAGNOSIS — I10 ESSENTIAL HYPERTENSION: ICD-10-CM

## 2017-11-22 RX ORDER — CARVEDILOL 12.5 MG/1
TABLET ORAL
Qty: 180 TABLET | Refills: 0 | Status: SHIPPED | OUTPATIENT
Start: 2017-11-22 | End: 2018-02-07 | Stop reason: SDUPTHER

## 2017-11-28 DIAGNOSIS — E11.9 TYPE 2 DIABETES MELLITUS WITHOUT COMPLICATION: ICD-10-CM

## 2018-02-07 ENCOUNTER — PATIENT MESSAGE (OUTPATIENT)
Dept: FAMILY MEDICINE | Facility: CLINIC | Age: 68
End: 2018-02-07

## 2018-02-07 DIAGNOSIS — I10 ESSENTIAL HYPERTENSION: ICD-10-CM

## 2018-02-07 RX ORDER — CARVEDILOL 12.5 MG/1
TABLET ORAL
Qty: 60 TABLET | Refills: 0 | Status: SHIPPED | OUTPATIENT
Start: 2018-02-07 | End: 2018-03-07 | Stop reason: SDUPTHER

## 2018-02-09 DIAGNOSIS — E11.9 TYPE 2 DIABETES MELLITUS WITHOUT COMPLICATION: ICD-10-CM

## 2018-03-06 DIAGNOSIS — I10 ESSENTIAL HYPERTENSION: ICD-10-CM

## 2018-03-07 RX ORDER — CARVEDILOL 12.5 MG/1
TABLET ORAL
Qty: 30 TABLET | Refills: 0 | Status: SHIPPED | OUTPATIENT
Start: 2018-03-07 | End: 2018-03-13 | Stop reason: SDUPTHER

## 2018-03-07 NOTE — TELEPHONE ENCOUNTER
A 2 week supply of medication sent to the pharmacy.  She hasn't been seen in almost 6 months.  She was previously informed to scheduled an appt before next refill request.

## 2018-03-07 NOTE — TELEPHONE ENCOUNTER
----- Message from Oriana Valentino sent at 3/7/2018 10:22 AM CST -----  returned call..[phone] (home)

## 2018-03-13 ENCOUNTER — TELEPHONE (OUTPATIENT)
Dept: FAMILY MEDICINE | Facility: CLINIC | Age: 68
End: 2018-03-13

## 2018-03-13 ENCOUNTER — PATIENT OUTREACH (OUTPATIENT)
Dept: ADMINISTRATIVE | Facility: HOSPITAL | Age: 68
End: 2018-03-13

## 2018-03-13 ENCOUNTER — OFFICE VISIT (OUTPATIENT)
Dept: FAMILY MEDICINE | Facility: CLINIC | Age: 68
End: 2018-03-13
Payer: MEDICARE

## 2018-03-13 VITALS
TEMPERATURE: 97 F | DIASTOLIC BLOOD PRESSURE: 80 MMHG | BODY MASS INDEX: 34.79 KG/M2 | SYSTOLIC BLOOD PRESSURE: 146 MMHG | HEART RATE: 80 BPM | OXYGEN SATURATION: 95 % | WEIGHT: 189.06 LBS | HEIGHT: 62 IN

## 2018-03-13 DIAGNOSIS — E78.5 DM TYPE 2 WITH DIABETIC DYSLIPIDEMIA: Primary | ICD-10-CM

## 2018-03-13 DIAGNOSIS — E03.9 HYPOTHYROIDISM, UNSPECIFIED TYPE: ICD-10-CM

## 2018-03-13 DIAGNOSIS — E11.69 DM TYPE 2 WITH DIABETIC DYSLIPIDEMIA: Primary | ICD-10-CM

## 2018-03-13 DIAGNOSIS — Z12.39 BREAST CANCER SCREENING: ICD-10-CM

## 2018-03-13 DIAGNOSIS — E03.9 ACQUIRED HYPOTHYROIDISM: ICD-10-CM

## 2018-03-13 DIAGNOSIS — I10 ESSENTIAL HYPERTENSION: ICD-10-CM

## 2018-03-13 LAB
CREAT UR-MCNC: 113 MG/DL
MICROALBUMIN UR DL<=1MG/L-MCNC: 6 UG/ML
MICROALBUMIN/CREATININE RATIO: 5.3 UG/MG

## 2018-03-13 PROCEDURE — 3079F DIAST BP 80-89 MM HG: CPT | Mod: S$GLB,,, | Performed by: FAMILY MEDICINE

## 2018-03-13 PROCEDURE — 90746 HEPB VACCINE 3 DOSE ADULT IM: CPT | Mod: S$GLB,,, | Performed by: FAMILY MEDICINE

## 2018-03-13 PROCEDURE — 99214 OFFICE O/P EST MOD 30 MIN: CPT | Mod: 25,S$GLB,, | Performed by: FAMILY MEDICINE

## 2018-03-13 PROCEDURE — 3077F SYST BP >= 140 MM HG: CPT | Mod: S$GLB,,, | Performed by: FAMILY MEDICINE

## 2018-03-13 PROCEDURE — 99999 PR PBB SHADOW E&M-EST. PATIENT-LVL IV: CPT | Mod: PBBFAC,,, | Performed by: FAMILY MEDICINE

## 2018-03-13 PROCEDURE — 82043 UR ALBUMIN QUANTITATIVE: CPT

## 2018-03-13 PROCEDURE — G0010 ADMIN HEPATITIS B VACCINE: HCPCS | Mod: S$GLB,,, | Performed by: FAMILY MEDICINE

## 2018-03-13 PROCEDURE — G0009 ADMIN PNEUMOCOCCAL VACCINE: HCPCS | Mod: S$GLB,,, | Performed by: FAMILY MEDICINE

## 2018-03-13 PROCEDURE — 90670 PCV13 VACCINE IM: CPT | Mod: S$GLB,,, | Performed by: FAMILY MEDICINE

## 2018-03-13 RX ORDER — AMOXICILLIN 500 MG
CAPSULE ORAL DAILY
COMMUNITY

## 2018-03-13 RX ORDER — LEVOTHYROXINE SODIUM 137 UG/1
137 TABLET ORAL DAILY
Qty: 60 TABLET | Refills: 0 | Status: SHIPPED | OUTPATIENT
Start: 2018-03-13 | End: 2018-04-24 | Stop reason: SDUPTHER

## 2018-03-13 RX ORDER — CARVEDILOL 12.5 MG/1
12.5 TABLET ORAL 2 TIMES DAILY
Qty: 30 TABLET | Refills: 0 | Status: SHIPPED | OUTPATIENT
Start: 2018-03-13 | End: 2018-04-02 | Stop reason: SDUPTHER

## 2018-03-13 NOTE — PROGRESS NOTES
Subjective:       Patient ID: Ruthann Swenson is a 67 y.o. female.    Chief Complaint: Medication Refill    67 y old here for f/u on DM, htn, dlp and hypothyroidism  Here for f.u . Not  On aspirin, it upsets stomach , ,  Will like hep b vac   , exercises: walks daily .  Opthalmology : will like to see opth . Not on hypoglycemic agent        Medication Refill       Review of Systems   Constitutional: Negative.    HENT: Negative.    Cardiovascular: Negative.    Gastrointestinal: Negative.    Genitourinary: Negative.    Musculoskeletal: Negative.    Hematological: Negative.        Objective:      Physical Exam   Constitutional: She is oriented to person, place, and time. She appears well-developed and well-nourished. No distress.   HENT:   Head: Normocephalic and atraumatic.   Right Ear: External ear normal.   Left Ear: External ear normal.   Mouth/Throat: No oropharyngeal exudate.   Eyes: Conjunctivae and EOM are normal. Pupils are equal, round, and reactive to light. Right eye exhibits no discharge. Left eye exhibits no discharge. No scleral icterus.   Neck: Normal range of motion. Neck supple. No JVD present. No tracheal deviation present. No thyromegaly present.   Cardiovascular: Normal rate, regular rhythm and normal heart sounds.  Exam reveals no gallop and no friction rub.    No murmur heard.  Pulses:       Dorsalis pedis pulses are 2+ on the right side, and 2+ on the left side.        Posterior tibial pulses are 2+ on the right side, and 2+ on the left side.   Pulmonary/Chest: Effort normal and breath sounds normal. No stridor. No respiratory distress. She has no wheezes. She has no rales. She exhibits no tenderness.   Abdominal: Soft. Bowel sounds are normal. She exhibits no distension. There is no tenderness. There is no rebound and no guarding.   Musculoskeletal: Normal range of motion.        Right foot: There is normal range of motion and no deformity.        Left foot: There is no deformity.    Feet:   Right Foot:   Protective Sensation: 10 sites tested. 10 sites sensed.   Skin Integrity: Negative for ulcer, blister, skin breakdown, erythema, warmth, callus or dry skin.   Left Foot:   Protective Sensation: 10 sites tested. 10 sites sensed.   Skin Integrity: Negative for ulcer, blister, skin breakdown, erythema, warmth, callus or dry skin.   Lymphadenopathy:     She has no cervical adenopathy.   Neurological: She is alert and oriented to person, place, and time.   Skin: Skin is warm and dry. She is not diaphoretic.   Psychiatric: She has a normal mood and affect. Her behavior is normal. Judgment and thought content normal.       Assessment:      Ruthann was seen today for medication refill.    Diagnoses and all orders for this visit:    DM type 2 with diabetic dyslipidemia  -     Ambulatory referral to Ophthalmology  -     CBC auto differential; Future  -     Comprehensive metabolic panel; Future  -     Hemoglobin A1c; Future  -     Lipid panel; Future  -     Microalbumin/creatinine urine ratio    Acquired hypothyroidism  -     levothyroxine (SYNTHROID) 137 MCG Tab tablet; Take 1 tablet (137 mcg total) by mouth once daily.    Essential hypertension  -     carvedilol (COREG) 12.5 MG tablet; Take 1 tablet (12.5 mg total) by mouth 2 (two) times daily.    Hypothyroidism, unspecified type  -     TSH; Future    Breast cancer screening  -     Mammo Digital Screening Bilateral With CAD; Future    Other orders  -     (In Office Administered) Pneumococcal Conjugate Vaccine (13 Valent) (IM)  -     (In Office Administered) Hepatitis B Vaccine (Adult) (IM)      Plan:   . Type II diabetes mellitus  Stable and well controlled currently. Continue Current medications as prescribed. No medication changes made today.   Pt. encouraged to follow a strict diabetic type diet.   Encouraged daily physical activity/exercise for at least 30mins if tolerated.   Weight control recommenced as always.   Discussed how lifestyle changes  make biggest impact on diabetes control.   Continue home glucose monitoring once daily and keep log.   Counseling provided today about hypoglycemia as well as about how diabetes increases risk of cardiovascular, cerebrovascular ,peripheral vascular disease and other serious health complications. He has been instructed to call if developing any new symptoms or hypoglycemia related symptoms.   See encounter for associated orders/medications.     Labs   Uncontrolled. Will start ace inh agreeable  N.v in 1 w     Labs

## 2018-03-29 ENCOUNTER — PATIENT OUTREACH (OUTPATIENT)
Dept: ADMINISTRATIVE | Facility: HOSPITAL | Age: 68
End: 2018-03-29

## 2018-03-29 DIAGNOSIS — I10 ESSENTIAL HYPERTENSION: ICD-10-CM

## 2018-03-29 RX ORDER — CARVEDILOL 12.5 MG/1
12.5 TABLET ORAL 2 TIMES DAILY
Qty: 30 TABLET | Refills: 0 | Status: CANCELLED | OUTPATIENT
Start: 2018-03-29

## 2018-03-29 NOTE — TELEPHONE ENCOUNTER
----- Message from Lucia Kat sent at 3/29/2018  8:29 AM CDT -----  Contact: pt  Pt calling for a refill on her medication, she can be reached at [phone]    1. What is the name of the medication you are requesting? carvedilol  2. What is the dose? 12.5 mg  3. How do you take the medication? Orally, topically, etc? orally  4. How often do you take this medication? 2 a day  5. Do you need a 30 day or 90 day supply? 90  6. How many refills are you requesting?   7. What is your preferred pharmacy and location of the pharmacy?   8. Who can we contact with further questions?       [facility] [address] [place] [id number]  Phone: [phone] Fax: [phone]

## 2018-03-29 NOTE — PROGRESS NOTES
Received Colonoscopy from [facility] [place] [place] [place] . Last Colonoscopy was on 02/04/2013.

## 2018-03-29 NOTE — LETTER
March 29, 2018    Dr Todd Panda             [facility]  [address]  [facility] [address]  Phone: [phone] March 29, 2018     Patient: Ruthann Swenson    YOB: 1950   Date of Visit: 3/29/2018     To whom it may concern:     We are seeing Ruthann Mckeon Leela, YOB: 1950, at [facility]. CHRISTIANO MACKAY MD is their primary care physician. To help with our [facility] maintenance records could you please send the following:       Most recent Colonoscopy Result with recommendation to repeat procedure. Report was received but we are needing providers recommendations or Clinical note that states when to repeat colonoscopy.      Please send fax to [phone], Audrey CALLOWAY LPN Care Coordination.    Thank-you in advance for your assistance. If you have any questions or concerns, please don't hesitate to contact me at [phone].     Krysta GUTIERREZ LPN  Care Coordination Department  [facility]

## 2018-03-30 DIAGNOSIS — I10 ESSENTIAL HYPERTENSION: ICD-10-CM

## 2018-03-30 RX ORDER — CARVEDILOL 12.5 MG/1
TABLET ORAL
Qty: 30 TABLET | Refills: 0 | Status: CANCELLED | OUTPATIENT
Start: 2018-03-30

## 2018-04-02 DIAGNOSIS — I10 ESSENTIAL HYPERTENSION: ICD-10-CM

## 2018-04-02 RX ORDER — CARVEDILOL 12.5 MG/1
12.5 TABLET ORAL 2 TIMES DAILY
Qty: 180 TABLET | Refills: 0 | Status: SHIPPED | OUTPATIENT
Start: 2018-04-02 | End: 2018-06-05 | Stop reason: SDUPTHER

## 2018-04-02 NOTE — TELEPHONE ENCOUNTER
----- Message from Nory Mathew sent at 4/2/2018  8:04 AM CDT -----  Contact: Pt  Pt calling in regards to she states that she still has not received her BP medication and has been out since 04/01/18 and would like for the prescription to be called in or to speak to the nurse.      Pt can be reached at .[phone] (home)       [facility] [address] [place] [address]  Phone: [phone] Fax: [phone]

## 2018-04-19 ENCOUNTER — OFFICE VISIT (OUTPATIENT)
Dept: FAMILY MEDICINE | Facility: CLINIC | Age: 68
End: 2018-04-19
Payer: MEDICARE

## 2018-04-19 ENCOUNTER — HOSPITAL ENCOUNTER (OUTPATIENT)
Dept: RADIOLOGY | Facility: HOSPITAL | Age: 68
Discharge: HOME | End: 2018-04-19
Attending: FAMILY MEDICINE
Payer: MEDICARE

## 2018-04-19 ENCOUNTER — PATIENT MESSAGE (OUTPATIENT)
Dept: FAMILY MEDICINE | Facility: CLINIC | Age: 68
End: 2018-04-19

## 2018-04-19 VITALS
SYSTOLIC BLOOD PRESSURE: 130 MMHG | OXYGEN SATURATION: 97 % | TEMPERATURE: 97 F | HEART RATE: 79 BPM | HEIGHT: 62 IN | BODY MASS INDEX: 34.74 KG/M2 | DIASTOLIC BLOOD PRESSURE: 70 MMHG | WEIGHT: 188.81 LBS | RESPIRATION RATE: 15 BRPM

## 2018-04-19 DIAGNOSIS — N39.0 URINARY TRACT INFECTION WITHOUT HEMATURIA, SITE UNSPECIFIED: Primary | ICD-10-CM

## 2018-04-19 DIAGNOSIS — R10.30 LOWER ABDOMINAL PAIN: ICD-10-CM

## 2018-04-19 DIAGNOSIS — R30.0 DYSURIA: ICD-10-CM

## 2018-04-19 DIAGNOSIS — Z87.442 HISTORY OF NEPHROLITHIASIS: ICD-10-CM

## 2018-04-19 DIAGNOSIS — E66.9 OBESITY (BMI 30-39.9): ICD-10-CM

## 2018-04-19 LAB
BILIRUB SERPL-MCNC: NORMAL MG/DL
BLOOD URINE, POC: 50
COLOR, POC UA: YELLOW
GLUCOSE UR QL STRIP: NORMAL
KETONES UR QL STRIP: NORMAL
LEUKOCYTE ESTERASE URINE, POC: NORMAL
NITRITE, POC UA: NORMAL
PH, POC UA: 5
PROTEIN, POC: NORMAL
SPECIFIC GRAVITY, POC UA: 1.02
UROBILINOGEN, POC UA: NORMAL

## 2018-04-19 PROCEDURE — 81002 URINALYSIS NONAUTO W/O SCOPE: CPT | Mod: S$GLB,,, | Performed by: FAMILY MEDICINE

## 2018-04-19 PROCEDURE — 87086 URINE CULTURE/COLONY COUNT: CPT

## 2018-04-19 PROCEDURE — 3078F DIAST BP <80 MM HG: CPT | Mod: S$GLB,,, | Performed by: FAMILY MEDICINE

## 2018-04-19 PROCEDURE — 3075F SYST BP GE 130 - 139MM HG: CPT | Mod: S$GLB,,, | Performed by: FAMILY MEDICINE

## 2018-04-19 PROCEDURE — 99999 PR PBB SHADOW E&M-EST. PATIENT-LVL IV: CPT | Mod: PBBFAC,,, | Performed by: FAMILY MEDICINE

## 2018-04-19 PROCEDURE — 87077 CULTURE AEROBIC IDENTIFY: CPT

## 2018-04-19 PROCEDURE — 87186 SC STD MICRODIL/AGAR DIL: CPT

## 2018-04-19 PROCEDURE — 99214 OFFICE O/P EST MOD 30 MIN: CPT | Mod: 25,S$GLB,, | Performed by: FAMILY MEDICINE

## 2018-04-19 PROCEDURE — 74018 RADEX ABDOMEN 1 VIEW: CPT | Mod: 26,,, | Performed by: RADIOLOGY

## 2018-04-19 PROCEDURE — 74018 RADEX ABDOMEN 1 VIEW: CPT | Mod: TC,PO

## 2018-04-19 PROCEDURE — 87088 URINE BACTERIA CULTURE: CPT

## 2018-04-19 PROCEDURE — 3045F PR MOST RECENT HEMOGLOBIN A1C LEVEL 7.0-9.0%: CPT | Mod: S$GLB,,, | Performed by: FAMILY MEDICINE

## 2018-04-19 RX ORDER — CEFUROXIME AXETIL 250 MG/1
250 TABLET ORAL 2 TIMES DAILY
Qty: 14 TABLET | Refills: 0 | Status: SHIPPED | OUTPATIENT
Start: 2018-04-19 | End: 2018-04-23 | Stop reason: ALTCHOICE

## 2018-04-19 NOTE — PROGRESS NOTES
"Subjective:       Patient ID: Ruthann Swenson is a 67 y.o. female.    Chief Complaint: Dysuria    HPI   Dysuria  66yo female presents today with report of dysuria for the past 3 days. She reports having some upper abdominal pain on awakening Monday but did not have any urinary symptoms. She notes taking NSAID medication and this alleviated symptoms. By Tuesday she had lower abdominal pain with accompanying dysuria. She has also noted urinary frequency. There has been no hematuria. She took AZO x 1 day which did not improve symptoms. Patient reports having nephrolithiasis in 1986- she passed the stone on her own without intervention. Current symptoms are unlike those when she had nephrolithiasis. She has not recently been on antibiotics. There is no report of vaginitis. Patient has type 2 DM but has not had any lab evaluation within the past year. She does not routinely monitor blood glucose levels but denies any symptoms of hyper or hypoglycemia.     Review of Systems   Constitutional: Negative for chills, fatigue and fever.   HENT: Negative for congestion, ear pain and sinus pressure.    Respiratory: Negative for cough and shortness of breath.    Cardiovascular: Negative for chest pain and palpitations.   Gastrointestinal: Positive for abdominal pain. Negative for abdominal distention, constipation, diarrhea, nausea and vomiting.   Endocrine: Negative for polydipsia and polyuria.   Genitourinary: Positive for dysuria and frequency. Negative for decreased urine volume, difficulty urinating, flank pain, hematuria, pelvic pain and vaginal discharge.   Musculoskeletal: Negative for arthralgias and myalgias.   Skin: Negative for rash.   Neurological: Negative for dizziness and weakness.   Psychiatric/Behavioral: Negative for dysphoric mood and sleep disturbance.       Objective:   /70   Pulse 79   Temp 97.2 °F (36.2 °C) (Temporal)   Resp 15   Ht 5' 2" (1.575 m)   Wt 85.6 kg (188 lb 13.2 oz)   SpO2 " 97%   BMI 34.54 kg/m²   Physical Exam   Constitutional: She appears well-developed and well-nourished. No distress.   Obese, non-toxic   HENT:   Head: Normocephalic and atraumatic.   Right Ear: External ear normal.   Left Ear: External ear normal.   Nose: Nose normal.   Mouth/Throat: Oropharynx is clear and moist.   Eyes: Conjunctivae and EOM are normal. Pupils are equal, round, and reactive to light.   Neck: Normal range of motion. Neck supple.   Cardiovascular: Normal rate, regular rhythm and normal heart sounds.    Pulmonary/Chest: Effort normal and breath sounds normal.   Abdominal: Soft. Bowel sounds are normal. She exhibits no distension. There is tenderness.   Genitourinary:   Genitourinary Comments: +suprapubic tenderness, no CVA tenderness   Musculoskeletal: She exhibits no edema.   Neurological: She is alert.   Skin: Skin is warm and dry. She is not diaphoretic.   Psychiatric: She has a normal mood and affect.       Assessment:       1. Urinary tract infection without hematuria, site unspecified    2. Dysuria    3. Lower abdominal pain    4. Uncontrolled type 2 diabetes mellitus with complication, without long-term current use of insulin    5. History of nephrolithiasis    6. Obesity (BMI 30-39.9)        Plan:      Urinary tract infection without hematuria, site unspecified  Will initiate Ceftin based on symptoms. UA is not consistent with significant abnormality. Patient notes similar incidence in the past with culture positive findings. She also reports past resistance to Bactrim. Will send RX for Ceftin. Advised maintaining hydration and monitoring for any acute changes. Offered Pyridium but she has denied. Should symptoms worsen she is asked to return for reassessment.  -     cefUROXime (CEFTIN) 250 MG tablet; Take 1 tablet (250 mg total) by mouth 2 (two) times daily.  Dispense: 14 tablet; Refill: 0    Dysuria  As above.  -     POCT URINE DIPSTICK WITHOUT MICROSCOPE  -     Urine culture; Future;  Expected date: 04/19/2018    Lower abdominal pain  KUB without significant abnormality. No bowel obstruction and no definite nephrolithiasis.  -     X-Ray Abdomen AP 1 View; Future; Expected date: 04/19/2018    Uncontrolled type 2 diabetes mellitus with complication, without long-term current use of insulin  Recommend proceeding with updated labs per Dr. Garcia with a return visit. Last A1c in April 2017 was uncontrolled at 7.4. Monitor for hyperglycemia in light of current symptoms.    History of nephrolithiasis  -     X-Ray Abdomen AP 1 View; Future; Expected date: 04/19/2018    Obesity  Weight loss efforts are encouraged through diet and lifestyle measures.    RTC prn for the above. See PCP for health maintenance/chronic care.

## 2018-04-23 DIAGNOSIS — N39.0 URINARY TRACT INFECTION WITHOUT HEMATURIA, SITE UNSPECIFIED: Primary | ICD-10-CM

## 2018-04-23 LAB — BACTERIA UR CULT: NORMAL

## 2018-04-23 RX ORDER — SULFAMETHOXAZOLE AND TRIMETHOPRIM 800; 160 MG/1; MG/1
1 TABLET ORAL 2 TIMES DAILY
Qty: 14 TABLET | Refills: 0 | Status: SHIPPED | OUTPATIENT
Start: 2018-04-23 | End: 2018-04-24

## 2018-04-24 ENCOUNTER — PATIENT OUTREACH (OUTPATIENT)
Dept: ADMINISTRATIVE | Facility: HOSPITAL | Age: 68
End: 2018-04-24

## 2018-04-24 ENCOUNTER — OFFICE VISIT (OUTPATIENT)
Dept: FAMILY MEDICINE | Facility: CLINIC | Age: 68
End: 2018-04-24
Payer: MEDICARE

## 2018-04-24 VITALS
SYSTOLIC BLOOD PRESSURE: 120 MMHG | HEART RATE: 81 BPM | HEIGHT: 62 IN | TEMPERATURE: 97 F | DIASTOLIC BLOOD PRESSURE: 68 MMHG | OXYGEN SATURATION: 96 % | WEIGHT: 187.19 LBS | BODY MASS INDEX: 34.45 KG/M2

## 2018-04-24 DIAGNOSIS — E78.5 DYSLIPIDEMIA: ICD-10-CM

## 2018-04-24 DIAGNOSIS — E03.9 ACQUIRED HYPOTHYROIDISM: ICD-10-CM

## 2018-04-24 DIAGNOSIS — I10 HYPERTENSION, UNSPECIFIED TYPE: ICD-10-CM

## 2018-04-24 DIAGNOSIS — E78.5 DM TYPE 2 WITH DIABETIC DYSLIPIDEMIA: ICD-10-CM

## 2018-04-24 DIAGNOSIS — Z12.39 BREAST CANCER SCREENING: Primary | ICD-10-CM

## 2018-04-24 DIAGNOSIS — R22.1 NECK MASS: ICD-10-CM

## 2018-04-24 DIAGNOSIS — E11.69 DM TYPE 2 WITH DIABETIC DYSLIPIDEMIA: ICD-10-CM

## 2018-04-24 PROCEDURE — 99999 PR PBB SHADOW E&M-EST. PATIENT-LVL IV: CPT | Mod: PBBFAC,,, | Performed by: FAMILY MEDICINE

## 2018-04-24 PROCEDURE — 3045F PR MOST RECENT HEMOGLOBIN A1C LEVEL 7.0-9.0%: CPT | Mod: S$GLB,,, | Performed by: FAMILY MEDICINE

## 2018-04-24 PROCEDURE — 90746 HEPB VACCINE 3 DOSE ADULT IM: CPT | Mod: S$GLB,,, | Performed by: FAMILY MEDICINE

## 2018-04-24 PROCEDURE — 99214 OFFICE O/P EST MOD 30 MIN: CPT | Mod: 25,S$GLB,, | Performed by: FAMILY MEDICINE

## 2018-04-24 PROCEDURE — 3078F DIAST BP <80 MM HG: CPT | Mod: S$GLB,,, | Performed by: FAMILY MEDICINE

## 2018-04-24 PROCEDURE — G0010 ADMIN HEPATITIS B VACCINE: HCPCS | Mod: S$GLB,,, | Performed by: FAMILY MEDICINE

## 2018-04-24 PROCEDURE — 3074F SYST BP LT 130 MM HG: CPT | Mod: S$GLB,,, | Performed by: FAMILY MEDICINE

## 2018-04-24 RX ORDER — LEVOTHYROXINE SODIUM 150 UG/1
150 TABLET ORAL DAILY
Qty: 90 TABLET | Refills: 0 | Status: SHIPPED | OUTPATIENT
Start: 2018-04-24 | End: 2018-09-15 | Stop reason: SDUPTHER

## 2018-04-24 RX ORDER — ATORVASTATIN CALCIUM 20 MG/1
20 TABLET, FILM COATED ORAL DAILY
Qty: 90 TABLET | Refills: 0 | Status: SHIPPED | OUTPATIENT
Start: 2018-04-24 | End: 2018-10-16

## 2018-04-24 NOTE — PROGRESS NOTES
Subjective:       Patient ID: Ruthann Swenson is a 67 y.o. female.    Chief Complaint: Follow-up    67 y old here for f/u on  Uncontrolled DM, htn, dlp and hypothyroidism  Here for f.u . Not  On aspirin, it upsets stomach ,  Will like  2 hep b vac   , exercises: walks daily .  Opthalmology : will like to see opth toward the end of MAY  . Not on hypoglycemic agent  . Knot on R side of neck for years . It has enlarged . Niece has thyroid  Ca. Extremely concerned       Review of Systems   Constitutional: Negative.    HENT: Negative.    Eyes: Negative.    Respiratory: Negative.    Cardiovascular: Negative.    Gastrointestinal: Negative.    Genitourinary: Negative.    Musculoskeletal: Negative.    Skin: Negative.    Hematological: Negative.        Objective:      Physical Exam   Constitutional: She is oriented to person, place, and time. She appears well-developed and well-nourished. No distress.   HENT:   Head: Normocephalic and atraumatic.   Right Ear: External ear normal.   Left Ear: External ear normal.   Mouth/Throat: No oropharyngeal exudate.   Eyes: Conjunctivae and EOM are normal. Pupils are equal, round, and reactive to light. Right eye exhibits no discharge. Left eye exhibits no discharge. No scleral icterus.   Neck: Normal range of motion. Neck supple. No JVD present. No tracheal deviation present. No thyromegaly present.   Cardiovascular: Normal rate, regular rhythm and normal heart sounds.  Exam reveals no gallop and no friction rub.    No murmur heard.  Pulmonary/Chest: Effort normal and breath sounds normal. No stridor. No respiratory distress. She has no wheezes. She has no rales. She exhibits no tenderness.   Abdominal: Soft. Bowel sounds are normal. She exhibits no distension. There is no tenderness. There is no rebound and no guarding.   Musculoskeletal: Normal range of motion.   Lymphadenopathy:     She has no cervical adenopathy.   Neurological: She is alert and oriented to person, place,  and time.   Skin: Skin is warm and dry. She is not diaphoretic.   Psychiatric: She has a normal mood and affect. Her behavior is normal. Judgment and thought content normal.       Assessment:       Ruthann was seen today for follow-up.    Diagnoses and all orders for this visit:    Breast cancer screening  -     Mammo Digital Screening Bilateral With CAD; Future    DM type 2 with diabetic dyslipidemia  -     Ambulatory referral to Ophthalmology    Neck mass  -     US Soft Tissue Head Neck Thyroid; Future    Acquired hypothyroidism  -     levothyroxine (SYNTHROID) 150 MCG tablet; Take 1 tablet (150 mcg total) by mouth once daily.    Hypertension, unspecified type    Dyslipidemia    Other orders  -     (In Office Administered) Hepatitis B Vaccine (Adult) (IM)  -     atorvastatin (LIPITOR) 20 MG tablet; Take 1 tablet (20 mg total) by mouth once daily.      Plan:     Ruthann was seen today for follow-up.    Diagnoses and all orders for this visit:    Breast cancer screening  -     Mammo Digital Screening Bilateral With CAD; Future    DM type 2 with diabetic dyslipidemia  -     Ambulatory referral to Ophthalmology    Neck mass  -     US Soft Tissue Head Neck Thyroid; Future    Acquired hypothyroidism  -     levothyroxine (SYNTHROID) 150 MCG tablet; Take 1 tablet (150 mcg total) by mouth once daily.    Other orders  -     (In Office Administered) Hepatitis B Vaccine (Adult) (IM)     Refused meds  Pt. encouraged to follow a strict diabetic type diet.   Encouraged daily physical activity/exercise for at least 30mins if tolerated.   Weight control recommenced as always.   Discussed how lifestyle changes make biggest impact on diabetes control.   Continue home glucose monitoring once daily and keep log.   Counseling provided today about hypoglycemia as well as about how diabetes increases risk of cardiovascular, cerebrovascular ,peripheral vascular disease and other serious health complications. He has been instructed to call  if developing any new symptoms or hypoglycemia related symptoms.   See encounter for associated orders/medications.   - Lipid panel; Future  U/s  Uncontrolled. Increase levothyroxine   Controlled.cont med   Statin . F.u in 3 m

## 2018-05-15 ENCOUNTER — APPOINTMENT (OUTPATIENT)
Dept: RADIOLOGY | Facility: HOSPITAL | Age: 68
End: 2018-05-15
Attending: FAMILY MEDICINE
Payer: MEDICARE

## 2018-05-15 DIAGNOSIS — R22.1 NECK MASS: ICD-10-CM

## 2018-05-15 PROCEDURE — 76999 ECHO EXAMINATION PROCEDURE: CPT | Mod: TC,PO

## 2018-05-15 PROCEDURE — 76536 US EXAM OF HEAD AND NECK: CPT | Mod: 26,XS,RT, | Performed by: RADIOLOGY

## 2018-05-15 PROCEDURE — 76536 US EXAM OF HEAD AND NECK: CPT | Mod: TC,PO

## 2018-05-16 ENCOUNTER — TELEPHONE (OUTPATIENT)
Dept: FAMILY MEDICINE | Facility: CLINIC | Age: 68
End: 2018-05-16

## 2018-05-16 DIAGNOSIS — E04.1 THYROID NODULE: Primary | ICD-10-CM

## 2018-05-16 NOTE — TELEPHONE ENCOUNTER
Spoke with pt and informed of results. appt already scheduled per ENT department.   Pt verbalized understanding.

## 2018-05-16 NOTE — TELEPHONE ENCOUNTER
----- Message from Joanie Ashley sent at 5/16/2018  4:21 PM CDT -----  Contact: self/[phone]  Returning call, please call back at [phone]. Thanks/ar

## 2018-06-04 ENCOUNTER — TELEPHONE (OUTPATIENT)
Dept: FAMILY MEDICINE | Facility: CLINIC | Age: 68
End: 2018-06-04

## 2018-06-04 NOTE — TELEPHONE ENCOUNTER
Patient states that she was has ran out of her Coreg due to having to take medication more to keep blood pressure down. Patient states that she is taking the medication twice in the morning and twice at night. Please advise.

## 2018-06-04 NOTE — TELEPHONE ENCOUNTER
----- Message from Jennifer Mckeon sent at 6/4/2018 12:41 PM CDT -----  Contact: pt   Call pt regarding blood pressure med. Pt states that she think she need a increase on her med.     [phone] (home)

## 2018-06-04 NOTE — TELEPHONE ENCOUNTER
Pt increased meds on her own. She states that if she takes the recommended dosage a 4 to 5 hours later she has to take another.

## 2018-06-05 DIAGNOSIS — I10 ESSENTIAL HYPERTENSION: ICD-10-CM

## 2018-06-06 RX ORDER — CARVEDILOL 12.5 MG/1
12.5 TABLET ORAL 2 TIMES DAILY
Qty: 180 TABLET | Refills: 0 | Status: SHIPPED | OUTPATIENT
Start: 2018-06-06 | End: 2018-06-12 | Stop reason: SDUPTHER

## 2018-06-08 ENCOUNTER — TELEPHONE (OUTPATIENT)
Dept: FAMILY MEDICINE | Facility: CLINIC | Age: 68
End: 2018-06-08

## 2018-06-08 NOTE — TELEPHONE ENCOUNTER
Spoke with patient and informed her of appointment on Tuesday. She verbalized understanding and did not have any further questions at this time.

## 2018-06-08 NOTE — TELEPHONE ENCOUNTER
Called patient and informed of appt time and day. Left message to return call and reschedule if needed.

## 2018-06-08 NOTE — TELEPHONE ENCOUNTER
----- Message from Avani Mayfield sent at 6/8/2018 11:17 AM CDT -----  Contact: pt  Calling about a missed call and please advise. [phone] (home)

## 2018-06-12 ENCOUNTER — HOSPITAL ENCOUNTER (OUTPATIENT)
Dept: RADIOLOGY | Facility: HOSPITAL | Age: 68
Discharge: HOME | End: 2018-06-12
Attending: FAMILY MEDICINE
Payer: MEDICARE

## 2018-06-12 ENCOUNTER — OFFICE VISIT (OUTPATIENT)
Dept: FAMILY MEDICINE | Facility: CLINIC | Age: 68
End: 2018-06-12
Payer: MEDICARE

## 2018-06-12 VITALS
OXYGEN SATURATION: 95 % | DIASTOLIC BLOOD PRESSURE: 64 MMHG | HEIGHT: 62 IN | TEMPERATURE: 98 F | SYSTOLIC BLOOD PRESSURE: 108 MMHG | BODY MASS INDEX: 34.89 KG/M2 | HEART RATE: 76 BPM | WEIGHT: 189.63 LBS

## 2018-06-12 DIAGNOSIS — Z12.39 BREAST CANCER SCREENING: ICD-10-CM

## 2018-06-12 DIAGNOSIS — I10 ESSENTIAL HYPERTENSION: ICD-10-CM

## 2018-06-12 DIAGNOSIS — E78.5 DYSLIPIDEMIA: Primary | ICD-10-CM

## 2018-06-12 PROCEDURE — 77063 BREAST TOMOSYNTHESIS BI: CPT | Mod: 26,,, | Performed by: RADIOLOGY

## 2018-06-12 PROCEDURE — 77067 SCR MAMMO BI INCL CAD: CPT | Mod: 26,,, | Performed by: RADIOLOGY

## 2018-06-12 PROCEDURE — 3008F BODY MASS INDEX DOCD: CPT | Mod: S$GLB,,, | Performed by: FAMILY MEDICINE

## 2018-06-12 PROCEDURE — 99213 OFFICE O/P EST LOW 20 MIN: CPT | Mod: S$GLB,,, | Performed by: FAMILY MEDICINE

## 2018-06-12 PROCEDURE — 3078F DIAST BP <80 MM HG: CPT | Mod: S$GLB,,, | Performed by: FAMILY MEDICINE

## 2018-06-12 PROCEDURE — 99999 PR PBB SHADOW E&M-EST. PATIENT-LVL III: CPT | Mod: PBBFAC,,, | Performed by: FAMILY MEDICINE

## 2018-06-12 PROCEDURE — 77067 SCR MAMMO BI INCL CAD: CPT | Mod: TC

## 2018-06-12 PROCEDURE — 3074F SYST BP LT 130 MM HG: CPT | Mod: S$GLB,,, | Performed by: FAMILY MEDICINE

## 2018-06-12 RX ORDER — CARVEDILOL 12.5 MG/1
TABLET ORAL
Qty: 270 TABLET | Refills: 0 | Status: SHIPPED | OUTPATIENT
Start: 2018-06-12 | End: 2018-11-20 | Stop reason: SDUPTHER

## 2018-06-12 NOTE — PROGRESS NOTES
Subjective:       Patient ID: Ruthann Swenson is a 67 y.o. female.    Chief Complaint: Hypertension    67 y old female with HTN here for f.u . She has had very high home  readings  over the last week  (200/100). Denies any stressors , has cut back on na. She decided to increase coreg dose which has help . Currently taking Coreg 37.5 mg divided daily  . She also stopped taking multivitamins that had over 20 % of Na content when she read label       Review of Systems   Constitutional: Negative.    HENT: Negative.    Eyes: Negative.    Respiratory: Negative.    Cardiovascular: Negative.    Gastrointestinal: Negative.    Genitourinary: Negative.    Musculoskeletal: Negative.    Skin: Negative.    Hematological: Negative.        Objective:      Physical Exam   Constitutional: She is oriented to person, place, and time. She appears well-developed and well-nourished. No distress.   HENT:   Head: Normocephalic and atraumatic.   Right Ear: External ear normal.   Left Ear: External ear normal.   Mouth/Throat: No oropharyngeal exudate.   Eyes: Conjunctivae and EOM are normal. Pupils are equal, round, and reactive to light. Right eye exhibits no discharge. Left eye exhibits no discharge. No scleral icterus.   Neck: Normal range of motion. Neck supple. No JVD present. No tracheal deviation present. No thyromegaly present.   Cardiovascular: Normal rate, regular rhythm and normal heart sounds.  Exam reveals no gallop and no friction rub.    No murmur heard.  Pulmonary/Chest: Effort normal and breath sounds normal. No stridor. No respiratory distress. She has no wheezes. She has no rales. She exhibits no tenderness.   Abdominal: Soft. Bowel sounds are normal. She exhibits no distension. There is no tenderness. There is no rebound and no guarding.   Musculoskeletal: Normal range of motion.   Lymphadenopathy:     She has no cervical adenopathy.   Neurological: She is alert and oriented to person, place, and time.   Skin:  Skin is warm and dry. She is not diaphoretic.   Psychiatric: She has a normal mood and affect. Her behavior is normal. Judgment and thought content normal.       Assessment:       1. Dyslipidemia    2. Essential hypertension        Plan:     Ruthann was seen today for hypertension.    Diagnoses and all orders for this visit:    Dyslipidemia  -     Lipid panel; Future    Essential hypertension  -     carvedilol (COREG) 12.5 MG tablet; 1 tab and 1/2 po BID     Controlled. Will bring BP cuff in am to compare BP readings

## 2018-06-18 ENCOUNTER — OFFICE VISIT (OUTPATIENT)
Dept: OTOLARYNGOLOGY | Facility: CLINIC | Age: 68
End: 2018-06-18
Payer: MEDICARE

## 2018-06-18 VITALS
TEMPERATURE: 98 F | HEART RATE: 70 BPM | SYSTOLIC BLOOD PRESSURE: 139 MMHG | HEIGHT: 62 IN | DIASTOLIC BLOOD PRESSURE: 73 MMHG | WEIGHT: 188.94 LBS | BODY MASS INDEX: 34.77 KG/M2

## 2018-06-18 DIAGNOSIS — E04.1 THYROID NODULE: Primary | ICD-10-CM

## 2018-06-18 DIAGNOSIS — R59.0 CERVICAL LYMPHADENOPATHY: ICD-10-CM

## 2018-06-18 PROCEDURE — 3078F DIAST BP <80 MM HG: CPT | Mod: S$GLB,,, | Performed by: ORTHOPAEDIC SURGERY

## 2018-06-18 PROCEDURE — 99999 PR PBB SHADOW E&M-EST. PATIENT-LVL III: CPT | Mod: PBBFAC,,, | Performed by: ORTHOPAEDIC SURGERY

## 2018-06-18 PROCEDURE — 3075F SYST BP GE 130 - 139MM HG: CPT | Mod: S$GLB,,, | Performed by: ORTHOPAEDIC SURGERY

## 2018-06-18 PROCEDURE — 99204 OFFICE O/P NEW MOD 45 MIN: CPT | Mod: S$GLB,,, | Performed by: ORTHOPAEDIC SURGERY

## 2018-06-18 PROCEDURE — 3008F BODY MASS INDEX DOCD: CPT | Mod: S$GLB,,, | Performed by: ORTHOPAEDIC SURGERY

## 2018-06-18 NOTE — LETTER
June 18, 2018      Gifty Noe MD  [address] [facility] [address]           [facility]  [address] [place] [id number]  Phone: [phone]  Fax: [phone]          Patient: Ruthann Swenson   MR Number: [id number]   YOB: 1950   Date of Visit: 6/18/2018       Dear Dr. Gifty Noe:    Thank you for referring Ruthann Swenson to me for evaluation. Attached you will find relevant portions of my assessment and plan of care.    If you have questions, please do not hesitate to call me. I look forward to following Ruthann Swenson along with you.    Sincerely,    Millicent Kovacs MD    Enclosure  CC:  No Recipients    If you would like to receive this communication electronically, please contact [email or web address] or [phone] to request more information on [facility] Link access.    For providers and/or their staff who would like to refer a patient to [facility], please contact us through our one-stop-shop provider referral line, [facility], at [phone].    If you feel you have received this communication in error or would no longer like to receive these types of communications, please e-mail [email or web address]

## 2018-06-18 NOTE — PROGRESS NOTES
Subjective:       Patient ID: Ruthann Swenson is a 67 y.o. female.    Chief Complaint: Neck Swelling    Patient is a very pleasant 67 year old female here to see me today for the first time for evaluation of a long-standing swelling in her right submandibular area.  She says that it has been present for the last 20 years, and has not significantly grown or changed over that period of time.  She is not sure if it was present in childhood.  She does not smoke.  She has no difficulty swallowing solids or liquids, but had one episode of food impaction about 2 years ago eating beef that did require the Heimlich maneuver.  She has a family history of thyroid cancer.  She has been on Synthroid for many years, has recently had an increase in her dose to 150 mcg daily.      Review of Systems   Constitutional: Negative for chills, fatigue, fever and unexpected weight change.   HENT: Negative for congestion, dental problem, ear discharge, ear pain, facial swelling, hearing loss, nosebleeds, postnasal drip, rhinorrhea, sinus pressure, sneezing, sore throat, tinnitus, trouble swallowing (primarily with beef as above) and voice change.    Eyes: Negative for redness, itching and visual disturbance.   Respiratory: Negative for cough, choking, shortness of breath and wheezing.    Cardiovascular: Negative for chest pain and palpitations.   Gastrointestinal: Negative for abdominal pain.        No reflux.   Musculoskeletal: Negative for gait problem.   Skin: Negative for rash.   Allergic/Immunologic: Positive for environmental allergies.   Neurological: Negative for dizziness, light-headedness and headaches.       Objective:      Physical Exam   Constitutional: She is oriented to person, place, and time. She appears well-developed and well-nourished. No distress.   HENT:   Head: Normocephalic and atraumatic.   Right Ear: Tympanic membrane, external ear and ear canal normal.   Left Ear: Tympanic membrane, external ear and ear  canal normal.   Nose: Nose normal. No mucosal edema, rhinorrhea, nasal deformity or septal deviation. No epistaxis. Right sinus exhibits no maxillary sinus tenderness and no frontal sinus tenderness. Left sinus exhibits no maxillary sinus tenderness and no frontal sinus tenderness.   Mouth/Throat: Uvula is midline, oropharynx is clear and moist and mucous membranes are normal. Mucous membranes are not pale and not dry. No dental caries. No oropharyngeal exudate or posterior oropharyngeal erythema.   Eyes: Conjunctivae, EOM and lids are normal. Pupils are equal, round, and reactive to light. Right eye exhibits no chemosis. Left eye exhibits no chemosis. Right conjunctiva is not injected. Left conjunctiva is not injected. No scleral icterus. Right eye exhibits normal extraocular motion and no nystagmus. Left eye exhibits normal extraocular motion and no nystagmus.   Neck: Trachea normal and phonation normal. No tracheal tenderness present. No tracheal deviation present. No thyroid mass and no thyromegaly present.   Cardiovascular: Intact distal pulses.    Pulmonary/Chest: Effort normal. No stridor. No respiratory distress.   Abdominal: She exhibits no distension.   Lymphadenopathy:        Head (right side): No submental, no submandibular, no preauricular, no posterior auricular and no occipital adenopathy present.        Head (left side): No submental, no submandibular, no preauricular, no posterior auricular and no occipital adenopathy present.     She has no cervical adenopathy.   Neurological: She is alert and oriented to person, place, and time. No cranial nerve deficit.   Skin: Skin is warm and dry. No rash noted. No erythema.   Psychiatric: She has a normal mood and affect. Her behavior is normal.       US NECK:    FINDINGS:  Two discrete lymph nodes identified in the area of reported palpable abnormality.  These measure 0.6 x 0.5 x 1.1 cm and 1.2 x 0.4 x 0.6 cm.    No other cystic, solid or complex nodule or  mass identified.   Impression       Two subcentimeter short axis nodes identified in the area of reported palpable abnormality.  See above.         US THYROID:    FINDINGS:  Right lobe measures 4.1 x 1.5 x 1.5 cm in the left lobe 3.6 x 1.5 x 1.2 cm.  Isthmus measures 5 mm maximum diameter.  Total gland volume is 7.4 mL.    Well-circumscribed solid nodule identified within the right inferior pole measuring 1.1 x 1.0 x 1.1 cm.  Heterogeneous echotexture noted.  Minimal peripheral flow identified on color imaging.    Solid nodule measuring 0.7 x 0.7 x 0.7 cm noted within the right lateral margin of the isthmus.  Heterogeneous slightly decreased echotexture.  Heterogeneous echotexture identified throughout the remainder of the gland including the isthmus.   Impression       Normal size gland with 2 solid nodules identified measuring 1.1 cm in the inferior pole right lobe and 0.7 cm in the right lateral aspect of the isthmus.             Assessment:       1. Thyroid nodule    2. Cervical lymphadenopathy        Plan:       1.  Thyroid nodule:  We discussed the function of the thyroid gland, as well as relevant anatomy.  We reviewed her recent thyroid ultrasound, and that the current guidelines recommend US guided FNA of a low risk nodule at 1.5 cm.  Will follow, and have her return to clinic in six months with repeat US prior.    2015 Classification of Thyroid nodules based on Ultrasound Pattern  [] High Suspicion- solid hypoechoic nodule or component of a partially cystic nodule WITH one or more: irregular margins, microCa, taller than wide shape, rim calcifications with small extrusive soft tissue component, evidence of ETE  (FNA >= 1cm)  [] Intermediate Suspicion- Hypoechoic solid nodule with smooth margins WITHOUT microCa, irregular margin/ETE or taller than wide (FNA >=1cm)  [x]  Low Suspicion- Isoechoic or hyperechoic solid nodule or partially cystic nodule with eccentric solid areas WITHOUT microCa, irregular  margin/ETE or taller than wide (FNA>=1.5cm)  []  Very Low Suspicion- Spongiform or partially cystic nodules without any of the above features (FNA>=2cm, or observation)  []  Benign- Purely cystic (No indication for FNA)    2.  Cervical lymphadenopathy:  Reassured patient that her exam and US today is normal, and that his US has no worrisome characteristics at this time.  Will repeat US neck at time of US thyroid.

## 2018-09-15 DIAGNOSIS — E03.9 ACQUIRED HYPOTHYROIDISM: ICD-10-CM

## 2018-09-17 RX ORDER — LEVOTHYROXINE SODIUM 150 UG/1
TABLET ORAL
Qty: 12 TABLET | Refills: 0 | Status: SHIPPED | OUTPATIENT
Start: 2018-09-17 | End: 2018-10-04 | Stop reason: SDUPTHER

## 2018-10-03 ENCOUNTER — OFFICE VISIT (OUTPATIENT)
Dept: FAMILY MEDICINE | Facility: CLINIC | Age: 68
End: 2018-10-03
Payer: MEDICARE

## 2018-10-03 ENCOUNTER — TELEPHONE (OUTPATIENT)
Dept: FAMILY MEDICINE | Facility: CLINIC | Age: 68
End: 2018-10-03

## 2018-10-03 VITALS
HEIGHT: 62 IN | DIASTOLIC BLOOD PRESSURE: 76 MMHG | TEMPERATURE: 98 F | HEART RATE: 74 BPM | OXYGEN SATURATION: 95 % | WEIGHT: 190.81 LBS | SYSTOLIC BLOOD PRESSURE: 144 MMHG | BODY MASS INDEX: 35.11 KG/M2

## 2018-10-03 DIAGNOSIS — R30.0 DYSURIA: Primary | ICD-10-CM

## 2018-10-03 LAB
BILIRUB SERPL-MCNC: ABNORMAL MG/DL
BLOOD URINE, POC: ABNORMAL
COLOR, POC UA: YELLOW
GLUCOSE UR QL STRIP: NORMAL
KETONES UR QL STRIP: ABNORMAL
LEUKOCYTE ESTERASE URINE, POC: ABNORMAL
NITRITE, POC UA: ABNORMAL
PH, POC UA: 7
PROTEIN, POC: ABNORMAL
SPECIFIC GRAVITY, POC UA: 1.01
UROBILINOGEN, POC UA: NORMAL

## 2018-10-03 PROCEDURE — 99213 OFFICE O/P EST LOW 20 MIN: CPT | Mod: S$PBB,,, | Performed by: FAMILY MEDICINE

## 2018-10-03 PROCEDURE — 81002 URINALYSIS NONAUTO W/O SCOPE: CPT | Mod: PBBFAC,PO | Performed by: FAMILY MEDICINE

## 2018-10-03 PROCEDURE — 3078F DIAST BP <80 MM HG: CPT | Mod: ,,, | Performed by: FAMILY MEDICINE

## 2018-10-03 PROCEDURE — 81001 URINALYSIS AUTO W/SCOPE: CPT

## 2018-10-03 PROCEDURE — 87086 URINE CULTURE/COLONY COUNT: CPT

## 2018-10-03 PROCEDURE — 1101F PT FALLS ASSESS-DOCD LE1/YR: CPT | Mod: ,,, | Performed by: FAMILY MEDICINE

## 2018-10-03 PROCEDURE — 3077F SYST BP >= 140 MM HG: CPT | Mod: ,,, | Performed by: FAMILY MEDICINE

## 2018-10-03 PROCEDURE — 99999 PR PBB SHADOW E&M-EST. PATIENT-LVL IV: CPT | Mod: PBBFAC,,, | Performed by: FAMILY MEDICINE

## 2018-10-03 PROCEDURE — 99214 OFFICE O/P EST MOD 30 MIN: CPT | Mod: PBBFAC,PO | Performed by: FAMILY MEDICINE

## 2018-10-03 RX ORDER — SULFAMETHOXAZOLE AND TRIMETHOPRIM 800; 160 MG/1; MG/1
1 TABLET ORAL 2 TIMES DAILY
Qty: 20 TABLET | Refills: 0 | Status: SHIPPED | OUTPATIENT
Start: 2018-10-03 | End: 2018-10-16 | Stop reason: ALTCHOICE

## 2018-10-03 NOTE — PROGRESS NOTES
Subjective:       Patient ID: Ruthann Swenson is a 68 y.o. female.    Chief Complaint: Abdominal Pain (lower) and Back Pain (lower)    68 y old female with Lower back and abd pain for 5 days . No dysuria , no injuries, no urinary frequency, no chnages in BM , not alleviating not aggravating  factors  . She has had these symptoms before . She has been diagnosed with UTI then        Review of Systems   Constitutional: Negative.    HENT: Negative.    Eyes: Negative.    Respiratory: Negative.    Cardiovascular: Negative.    Gastrointestinal: Negative.    Genitourinary: Negative.    Musculoskeletal: Positive for arthralgias.   Skin: Negative.    Hematological: Negative.        Objective:      Physical Exam   Constitutional: She is oriented to person, place, and time. She appears well-developed and well-nourished. No distress.   HENT:   Head: Normocephalic and atraumatic.   Right Ear: External ear normal.   Left Ear: External ear normal.   Mouth/Throat: No oropharyngeal exudate.   Eyes: Conjunctivae and EOM are normal. Pupils are equal, round, and reactive to light. Right eye exhibits no discharge. Left eye exhibits no discharge. No scleral icterus.   Neck: Normal range of motion. Neck supple. No JVD present. No tracheal deviation present. No thyromegaly present.   Cardiovascular: Normal rate, regular rhythm and normal heart sounds. Exam reveals no gallop and no friction rub.   No murmur heard.  Pulmonary/Chest: Effort normal and breath sounds normal. No stridor. No respiratory distress. She has no wheezes. She has no rales. She exhibits no tenderness.   Abdominal: Soft. Bowel sounds are normal. She exhibits no distension. There is no tenderness. There is no rebound and no guarding.   Musculoskeletal: Normal range of motion.   Lymphadenopathy:     She has no cervical adenopathy.   Neurological: She is alert and oriented to person, place, and time.   Skin: Skin is warm and dry. She is not diaphoretic.    Psychiatric: She has a normal mood and affect. Her behavior is normal. Judgment and thought content normal.       Assessment:       1. Dysuria        Plan:     Ruthann was seen today for abdominal pain and back pain.    Diagnoses and all orders for this visit:    Dysuria  -     POCT URINE DIPSTICK WITHOUT MICROSCOPE  -     Urine culture; Future  -     Urinalysis Microscopic    Other orders  -     sulfamethoxazole-trimethoprim 800-160mg (BACTRIM DS) 800-160 mg Tab; Take 1 tablet by mouth 2 (two) times daily.     High BP . Will have her return in 1 week .

## 2018-10-04 DIAGNOSIS — E03.9 ACQUIRED HYPOTHYROIDISM: ICD-10-CM

## 2018-10-04 LAB
BACTERIA #/AREA URNS HPF: NORMAL /HPF
MICROSCOPIC COMMENT: NORMAL
RBC #/AREA URNS HPF: 1 /HPF (ref 0–4)
SQUAMOUS #/AREA URNS HPF: 2 /HPF
WBC #/AREA URNS HPF: 2 /HPF (ref 0–5)

## 2018-10-04 RX ORDER — LEVOTHYROXINE SODIUM 150 UG/1
150 TABLET ORAL DAILY
Qty: 30 TABLET | Refills: 0 | Status: SHIPPED | OUTPATIENT
Start: 2018-10-04 | End: 2018-11-20 | Stop reason: SDUPTHER

## 2018-10-04 NOTE — TELEPHONE ENCOUNTER
Spoke with patient and informed her that medication was sent in for a one month supply and that she will need to get labs done prior to one month.

## 2018-10-04 NOTE — TELEPHONE ENCOUNTER
----- Message from Avani Mayfield sent at 10/4/2018  9:46 AM CDT -----  Contact: pt  Calling in regards to a missed call and please advise [phone]

## 2018-10-04 NOTE — TELEPHONE ENCOUNTER
Spoke with pt, informed her that her BP was still high after the recheck on yesterday and Dr. Garcia recommends to make an appointment in 1 week and to come fasting. Pt states that she will call back to reschedule. Pt also requesting a refill on Synthroid.

## 2018-10-05 LAB
BACTERIA UR CULT: NORMAL
BACTERIA UR CULT: NORMAL

## 2018-10-16 ENCOUNTER — OFFICE VISIT (OUTPATIENT)
Dept: FAMILY MEDICINE | Facility: CLINIC | Age: 68
End: 2018-10-16
Payer: MEDICARE

## 2018-10-16 VITALS
OXYGEN SATURATION: 97 % | DIASTOLIC BLOOD PRESSURE: 72 MMHG | HEART RATE: 88 BPM | TEMPERATURE: 99 F | WEIGHT: 187.25 LBS | HEIGHT: 62 IN | BODY MASS INDEX: 34.46 KG/M2 | SYSTOLIC BLOOD PRESSURE: 122 MMHG

## 2018-10-16 DIAGNOSIS — J01.00 ACUTE NON-RECURRENT MAXILLARY SINUSITIS: ICD-10-CM

## 2018-10-16 DIAGNOSIS — J02.9 SORE THROAT: ICD-10-CM

## 2018-10-16 DIAGNOSIS — R50.9 FEVER, UNSPECIFIED FEVER CAUSE: Primary | ICD-10-CM

## 2018-10-16 LAB
CTP QC/QA: YES
CTP QC/QA: YES
FLUAV AG NPH QL: NEGATIVE
FLUBV AG NPH QL: NEGATIVE
S PYO RRNA THROAT QL PROBE: NEGATIVE

## 2018-10-16 PROCEDURE — 87804 INFLUENZA ASSAY W/OPTIC: CPT | Mod: PBBFAC,PO | Performed by: FAMILY MEDICINE

## 2018-10-16 PROCEDURE — 87081 CULTURE SCREEN ONLY: CPT

## 2018-10-16 PROCEDURE — 3074F SYST BP LT 130 MM HG: CPT | Mod: ,,, | Performed by: FAMILY MEDICINE

## 2018-10-16 PROCEDURE — 99214 OFFICE O/P EST MOD 30 MIN: CPT | Mod: PBBFAC,PO,25 | Performed by: FAMILY MEDICINE

## 2018-10-16 PROCEDURE — 87880 STREP A ASSAY W/OPTIC: CPT | Mod: PBBFAC,PO | Performed by: FAMILY MEDICINE

## 2018-10-16 PROCEDURE — 1101F PT FALLS ASSESS-DOCD LE1/YR: CPT | Mod: ,,, | Performed by: FAMILY MEDICINE

## 2018-10-16 PROCEDURE — 96372 THER/PROPH/DIAG INJ SC/IM: CPT | Mod: PBBFAC,PO

## 2018-10-16 PROCEDURE — 3078F DIAST BP <80 MM HG: CPT | Mod: ,,, | Performed by: FAMILY MEDICINE

## 2018-10-16 PROCEDURE — 99999 PR PBB SHADOW E&M-EST. PATIENT-LVL IV: CPT | Mod: PBBFAC,,, | Performed by: FAMILY MEDICINE

## 2018-10-16 PROCEDURE — 99213 OFFICE O/P EST LOW 20 MIN: CPT | Mod: 25,S$PBB,, | Performed by: FAMILY MEDICINE

## 2018-10-16 RX ORDER — AMOXICILLIN AND CLAVULANATE POTASSIUM 875; 125 MG/1; MG/1
1 TABLET, FILM COATED ORAL 2 TIMES DAILY
Qty: 20 TABLET | Refills: 0 | Status: SHIPPED | OUTPATIENT
Start: 2018-10-16 | End: 2018-11-01

## 2018-10-16 RX ORDER — METHYLPREDNISOLONE ACETATE 40 MG/ML
40 INJECTION, SUSPENSION INTRA-ARTICULAR; INTRALESIONAL; INTRAMUSCULAR; SOFT TISSUE
Status: COMPLETED | OUTPATIENT
Start: 2018-10-16 | End: 2018-10-16

## 2018-10-16 RX ADMIN — METHYLPREDNISOLONE ACETATE 40 MG: 40 INJECTION, SUSPENSION INTRALESIONAL; INTRAMUSCULAR; INTRASYNOVIAL; SOFT TISSUE at 04:10

## 2018-10-16 NOTE — PROGRESS NOTES
Subjective:       Patient ID: Ruthann Swenson is a 68 y.o. female.    Chief Complaint: Sinus Problem (dry cough, sneezing, headache, ear itching)    68 y old with nasal congestion , ST and malaise for 2 days . Grandson has similar symptoms . No sob , no cp . Taking otc cold meds       Review of Systems   Constitutional: Negative.    HENT: Positive for rhinorrhea and sinus pain.    Eyes: Negative.    Respiratory: Negative.    Cardiovascular: Negative.    Gastrointestinal: Negative.    Genitourinary: Negative.    Musculoskeletal: Negative.    Skin: Negative.    Hematological: Negative.        Objective:      Physical Exam   Constitutional: She is oriented to person, place, and time. She appears well-developed and well-nourished. No distress.   HENT:   Head: Normocephalic and atraumatic.   Right Ear: External ear normal.   Left Ear: External ear normal.   Mouth/Throat: No oropharyngeal exudate.   Eyes: Conjunctivae and EOM are normal. Pupils are equal, round, and reactive to light. Right eye exhibits no discharge. Left eye exhibits no discharge. No scleral icterus.   Neck: Normal range of motion. Neck supple. No JVD present. No tracheal deviation present. No thyromegaly present.   Cardiovascular: Normal rate, regular rhythm and normal heart sounds. Exam reveals no gallop and no friction rub.   No murmur heard.  Pulmonary/Chest: Effort normal and breath sounds normal. No stridor. No respiratory distress. She has no wheezes. She has no rales. She exhibits no tenderness.   Abdominal: Soft. Bowel sounds are normal. She exhibits no distension. There is no tenderness. There is no rebound and no guarding.   Musculoskeletal: Normal range of motion.   Lymphadenopathy:     She has no cervical adenopathy.   Neurological: She is alert and oriented to person, place, and time.   Skin: Skin is warm and dry. She is not diaphoretic.   Psychiatric: She has a normal mood and affect. Her behavior is normal. Judgment and thought  content normal.       Assessment:     Ruthann was seen today for sinus problem.    Diagnoses and all orders for this visit:    Fever, unspecified fever cause  -     POCT Influenza A/B    Sore throat  -     POCT Rapid Strep A  -     Strep A culture, throat    Acute non-recurrent maxillary sinusitis    Other orders  -     methylPREDNISolone acetate injection 40 mg; Inject 1 mL (40 mg total) into the muscle one time.  -     amoxicillin-clavulanate 875-125mg (AUGMENTIN) 875-125 mg per tablet; Take 1 tablet by mouth 2 (two) times daily.          Plan:     Ruthann was seen today for sinus problem.    Diagnoses and all orders for this visit:    Fever, unspecified fever cause  -     POCT Influenza A/B    Sore throat  -     POCT Rapid Strep A  -     Strep A culture, throat     Start Abx in 7 days if symptoms  Fail  to improve

## 2018-10-18 ENCOUNTER — OFFICE VISIT (OUTPATIENT)
Dept: FAMILY MEDICINE | Facility: CLINIC | Age: 68
End: 2018-10-18
Payer: MEDICARE

## 2018-10-18 VITALS
BODY MASS INDEX: 34.59 KG/M2 | TEMPERATURE: 98 F | HEIGHT: 62 IN | SYSTOLIC BLOOD PRESSURE: 120 MMHG | HEART RATE: 70 BPM | OXYGEN SATURATION: 96 % | DIASTOLIC BLOOD PRESSURE: 72 MMHG | WEIGHT: 187.94 LBS

## 2018-10-18 DIAGNOSIS — E78.5 DM TYPE 2 WITH DIABETIC DYSLIPIDEMIA: Primary | ICD-10-CM

## 2018-10-18 DIAGNOSIS — I10 HYPERTENSION, UNSPECIFIED TYPE: ICD-10-CM

## 2018-10-18 DIAGNOSIS — Z13.820 OSTEOPOROSIS SCREENING: ICD-10-CM

## 2018-10-18 DIAGNOSIS — E03.9 HYPOTHYROIDISM, UNSPECIFIED TYPE: ICD-10-CM

## 2018-10-18 DIAGNOSIS — E11.69 DM TYPE 2 WITH DIABETIC DYSLIPIDEMIA: Primary | ICD-10-CM

## 2018-10-18 PROCEDURE — 3078F DIAST BP <80 MM HG: CPT | Mod: ,,, | Performed by: FAMILY MEDICINE

## 2018-10-18 PROCEDURE — 99214 OFFICE O/P EST MOD 30 MIN: CPT | Mod: S$PBB,,, | Performed by: FAMILY MEDICINE

## 2018-10-18 PROCEDURE — 3074F SYST BP LT 130 MM HG: CPT | Mod: ,,, | Performed by: FAMILY MEDICINE

## 2018-10-18 PROCEDURE — 99999 PR PBB SHADOW E&M-EST. PATIENT-LVL IV: CPT | Mod: PBBFAC,,, | Performed by: FAMILY MEDICINE

## 2018-10-18 PROCEDURE — 1101F PT FALLS ASSESS-DOCD LE1/YR: CPT | Mod: ,,, | Performed by: FAMILY MEDICINE

## 2018-10-18 PROCEDURE — 99214 OFFICE O/P EST MOD 30 MIN: CPT | Mod: PBBFAC,PO | Performed by: FAMILY MEDICINE

## 2018-10-18 PROCEDURE — 3045F PR MOST RECENT HEMOGLOBIN A1C LEVEL 7.0-9.0%: CPT | Mod: ,,, | Performed by: FAMILY MEDICINE

## 2018-10-18 RX ORDER — ATORVASTATIN CALCIUM 20 MG/1
20 TABLET, FILM COATED ORAL DAILY
Qty: 90 TABLET | Refills: 0 | Status: SHIPPED | OUTPATIENT
Start: 2018-10-18 | End: 2019-01-29

## 2018-10-18 NOTE — PROGRESS NOTES
Subjective:       Patient ID: Ruthann Swenson is a 68 y.o. female.    Chief Complaint: Follow-up    68 y old female  here for f/u on DM, HTN , dlp and hypothyroidism . Not On aspirin,it upsets her stomach , needs 3rd  hep B   vacc And seasonal influenza . exercises :  walking daily , Opthalmology : willing to have us schedule cheryle . , Fastin bs are : 78  and  2 hrs pp : 140. No constipation , dry skin , depression        Review of Systems   Constitutional: Negative.    HENT: Negative.    Eyes: Negative.    Respiratory: Negative.    Cardiovascular: Negative.    Gastrointestinal: Negative.    Genitourinary: Negative.    Musculoskeletal: Negative.    Skin: Negative.    Hematological: Negative.        Objective:      Physical Exam   Constitutional: She is oriented to person, place, and time. No distress.   HENT:   Head: Normocephalic and atraumatic.   Right Ear: External ear normal.   Left Ear: External ear normal.   Mouth/Throat: No oropharyngeal exudate.   Eyes: Conjunctivae and EOM are normal. Pupils are equal, round, and reactive to light. Right eye exhibits no discharge. Left eye exhibits no discharge. No scleral icterus.   Neck: Normal range of motion. Neck supple. No JVD present. No tracheal deviation present. No thyromegaly present.   Cardiovascular: Normal rate, regular rhythm and normal heart sounds. Exam reveals no gallop and no friction rub.   No murmur heard.  Pulmonary/Chest: Effort normal and breath sounds normal. No stridor. No respiratory distress. She has no wheezes. She has no rales. She exhibits no tenderness.   Abdominal: Soft. Bowel sounds are normal. She exhibits no distension. There is no tenderness. There is no rebound and no guarding.   Musculoskeletal: Normal range of motion.   Lymphadenopathy:     She has no cervical adenopathy.   Neurological: She is alert and oriented to person, place, and time.   Skin: Skin is warm and dry. She is not diaphoretic.   Psychiatric: She has a normal  mood and affect. Her behavior is normal. Judgment and thought content normal.       Assessment:      Ruthann was seen today for follow-up.    Diagnoses and all orders for this visit:    DM type 2 with diabetic dyslipidemia  -     Ambulatory referral to Ophthalmology  -     CBC auto differential; Future  -     Comprehensive metabolic panel; Future  -     Hemoglobin A1c; Future  -     Lipid panel; Future    Hypertension, unspecified type    Hypothyroidism, unspecified type  -     TSH; Future    Osteoporosis screening  -     DXA Bone Density Spine And Hip; Future    Other orders  -     atorvastatin (LIPITOR) 20 MG tablet; Take 1 tablet (20 mg total) by mouth once daily.      Plan:     Pt. encouraged to follow a strict diabetic type diet.   Encouraged daily physical activity/exercise for at least 30mins if tolerated.   Weight control recommenced as always.   Discussed how lifestyle changes make biggest impact on diabetes control.   Continue home glucose monitoring once daily and keep log.   Counseling provided today about hypoglycemia as well as about how diabetes increases risk of cardiovascular, cerebrovascular ,peripheral vascular disease and other serious health complications. He has been instructed to call if developing any new symptoms or hypoglycemia related symptoms.   See encounter for associated orders/medications.   - Lipid panel; Future  Controlled . Cont med   Labs

## 2018-10-19 ENCOUNTER — LAB VISIT (OUTPATIENT)
Dept: LAB | Facility: HOSPITAL | Age: 68
End: 2018-10-19
Attending: FAMILY MEDICINE
Payer: MEDICARE

## 2018-10-19 DIAGNOSIS — E11.69 DM TYPE 2 WITH DIABETIC DYSLIPIDEMIA: ICD-10-CM

## 2018-10-19 DIAGNOSIS — E78.5 DM TYPE 2 WITH DIABETIC DYSLIPIDEMIA: ICD-10-CM

## 2018-10-19 DIAGNOSIS — E03.9 HYPOTHYROIDISM, UNSPECIFIED TYPE: ICD-10-CM

## 2018-10-19 LAB
ALBUMIN SERPL BCP-MCNC: 3.7 G/DL
ALP SERPL-CCNC: 101 U/L
ALT SERPL W/O P-5'-P-CCNC: 22 U/L
ANION GAP SERPL CALC-SCNC: 8 MMOL/L
AST SERPL-CCNC: 24 U/L
BACTERIA THROAT CULT: NORMAL
BASOPHILS # BLD AUTO: 0.08 K/UL
BASOPHILS NFR BLD: 0.8 %
BILIRUB SERPL-MCNC: 0.5 MG/DL
BUN SERPL-MCNC: 14 MG/DL
CALCIUM SERPL-MCNC: 9.4 MG/DL
CHLORIDE SERPL-SCNC: 104 MMOL/L
CHOLEST SERPL-MCNC: 182 MG/DL
CHOLEST/HDLC SERPL: 6.1 {RATIO}
CO2 SERPL-SCNC: 24 MMOL/L
CREAT SERPL-MCNC: 0.7 MG/DL
DIFFERENTIAL METHOD: NORMAL
EOSINOPHIL # BLD AUTO: 0.4 K/UL
EOSINOPHIL NFR BLD: 4.1 %
ERYTHROCYTE [DISTWIDTH] IN BLOOD BY AUTOMATED COUNT: 12.7 %
EST. GFR  (AFRICAN AMERICAN): >60 ML/MIN/1.73 M^2
EST. GFR  (NON AFRICAN AMERICAN): >60 ML/MIN/1.73 M^2
ESTIMATED AVG GLUCOSE: 186 MG/DL
GLUCOSE SERPL-MCNC: 161 MG/DL
HBA1C MFR BLD HPLC: 8.1 %
HCT VFR BLD AUTO: 42.5 %
HDLC SERPL-MCNC: 30 MG/DL
HDLC SERPL: 16.5 %
HGB BLD-MCNC: 13.9 G/DL
IMM GRANULOCYTES # BLD AUTO: 0.03 K/UL
IMM GRANULOCYTES NFR BLD AUTO: 0.3 %
LDLC SERPL CALC-MCNC: 129 MG/DL
LYMPHOCYTES # BLD AUTO: 2.9 K/UL
LYMPHOCYTES NFR BLD: 30 %
MCH RBC QN AUTO: 30.8 PG
MCHC RBC AUTO-ENTMCNC: 32.7 G/DL
MCV RBC AUTO: 94 FL
MONOCYTES # BLD AUTO: 0.9 K/UL
MONOCYTES NFR BLD: 8.9 %
NEUTROPHILS # BLD AUTO: 5.3 K/UL
NEUTROPHILS NFR BLD: 55.9 %
NONHDLC SERPL-MCNC: 152 MG/DL
NRBC BLD-RTO: 0 /100 WBC
PLATELET # BLD AUTO: 249 K/UL
PMV BLD AUTO: 11.8 FL
POTASSIUM SERPL-SCNC: 4.7 MMOL/L
PROT SERPL-MCNC: 7.7 G/DL
RBC # BLD AUTO: 4.51 M/UL
SODIUM SERPL-SCNC: 136 MMOL/L
TRIGL SERPL-MCNC: 115 MG/DL
TSH SERPL DL<=0.005 MIU/L-ACNC: 3.61 UIU/ML
WBC # BLD AUTO: 9.53 K/UL

## 2018-10-19 PROCEDURE — 80061 LIPID PANEL: CPT

## 2018-10-19 PROCEDURE — 85025 COMPLETE CBC W/AUTO DIFF WBC: CPT

## 2018-10-19 PROCEDURE — 80053 COMPREHEN METABOLIC PANEL: CPT

## 2018-10-19 PROCEDURE — 36415 COLL VENOUS BLD VENIPUNCTURE: CPT | Mod: PO

## 2018-10-19 PROCEDURE — 84443 ASSAY THYROID STIM HORMONE: CPT

## 2018-10-19 PROCEDURE — 83036 HEMOGLOBIN GLYCOSYLATED A1C: CPT

## 2018-11-01 ENCOUNTER — APPOINTMENT (OUTPATIENT)
Dept: RADIOLOGY | Facility: HOSPITAL | Age: 68
End: 2018-11-01
Attending: FAMILY MEDICINE
Payer: MEDICARE

## 2018-11-01 ENCOUNTER — TELEPHONE (OUTPATIENT)
Dept: FAMILY MEDICINE | Facility: CLINIC | Age: 68
End: 2018-11-01

## 2018-11-01 ENCOUNTER — OFFICE VISIT (OUTPATIENT)
Dept: FAMILY MEDICINE | Facility: CLINIC | Age: 68
End: 2018-11-01
Payer: MEDICARE

## 2018-11-01 ENCOUNTER — OFFICE VISIT (OUTPATIENT)
Dept: OPHTHALMOLOGY | Facility: CLINIC | Age: 68
End: 2018-11-01
Payer: MEDICARE

## 2018-11-01 VITALS
WEIGHT: 187.19 LBS | BODY MASS INDEX: 34.45 KG/M2 | SYSTOLIC BLOOD PRESSURE: 144 MMHG | HEIGHT: 62 IN | DIASTOLIC BLOOD PRESSURE: 80 MMHG | TEMPERATURE: 97 F | HEART RATE: 70 BPM | OXYGEN SATURATION: 98 %

## 2018-11-01 DIAGNOSIS — E11.9 DIABETES MELLITUS TYPE 2 WITHOUT RETINOPATHY: Primary | ICD-10-CM

## 2018-11-01 DIAGNOSIS — Z13.820 OSTEOPOROSIS SCREENING: ICD-10-CM

## 2018-11-01 DIAGNOSIS — R19.7 DIARRHEA, UNSPECIFIED TYPE: Primary | ICD-10-CM

## 2018-11-01 DIAGNOSIS — H52.4 BILATERAL PRESBYOPIA: ICD-10-CM

## 2018-11-01 DIAGNOSIS — I10 ESSENTIAL HYPERTENSION: ICD-10-CM

## 2018-11-01 DIAGNOSIS — H52.03 HYPEROPIA, BILATERAL: ICD-10-CM

## 2018-11-01 DIAGNOSIS — R10.13 EPIGASTRIC PAIN: ICD-10-CM

## 2018-11-01 LAB
BACTERIA #/AREA URNS AUTO: ABNORMAL /HPF
BILIRUB UR QL STRIP: NEGATIVE
CLARITY UR REFRACT.AUTO: CLEAR
COLOR UR AUTO: YELLOW
GLUCOSE UR QL STRIP: NEGATIVE
HGB UR QL STRIP: NEGATIVE
KETONES UR QL STRIP: NEGATIVE
LEUKOCYTE ESTERASE UR QL STRIP: ABNORMAL
MICROSCOPIC COMMENT: ABNORMAL
NITRITE UR QL STRIP: NEGATIVE
PH UR STRIP: 5 [PH] (ref 5–8)
PROT UR QL STRIP: NEGATIVE
RBC #/AREA URNS AUTO: 0 /HPF (ref 0–4)
SP GR UR STRIP: 1.01 (ref 1–1.03)
SQUAMOUS #/AREA URNS AUTO: 3 /HPF
URN SPEC COLLECT METH UR: ABNORMAL
WBC #/AREA URNS AUTO: 9 /HPF (ref 0–5)

## 2018-11-01 PROCEDURE — 3079F DIAST BP 80-89 MM HG: CPT | Mod: S$GLB,,, | Performed by: FAMILY MEDICINE

## 2018-11-01 PROCEDURE — 81001 URINALYSIS AUTO W/SCOPE: CPT

## 2018-11-01 PROCEDURE — 92004 COMPRE OPH EXAM NEW PT 1/>: CPT | Mod: S$GLB,,, | Performed by: OPTOMETRIST

## 2018-11-01 PROCEDURE — 77080 DXA BONE DENSITY AXIAL: CPT | Mod: 26,,, | Performed by: RADIOLOGY

## 2018-11-01 PROCEDURE — 77080 DXA BONE DENSITY AXIAL: CPT | Mod: TC

## 2018-11-01 PROCEDURE — 1101F PT FALLS ASSESS-DOCD LE1/YR: CPT | Mod: S$GLB,,, | Performed by: FAMILY MEDICINE

## 2018-11-01 PROCEDURE — 3077F SYST BP >= 140 MM HG: CPT | Mod: S$GLB,,, | Performed by: FAMILY MEDICINE

## 2018-11-01 PROCEDURE — 92015 DETERMINE REFRACTIVE STATE: CPT | Mod: S$GLB,,, | Performed by: OPTOMETRIST

## 2018-11-01 PROCEDURE — 99999 PR PBB SHADOW E&M-EST. PATIENT-LVL III: CPT | Mod: PBBFAC,,, | Performed by: OPTOMETRIST

## 2018-11-01 PROCEDURE — 99214 OFFICE O/P EST MOD 30 MIN: CPT | Mod: S$GLB,,, | Performed by: FAMILY MEDICINE

## 2018-11-01 PROCEDURE — 99214 OFFICE O/P EST MOD 30 MIN: CPT | Mod: PBBFAC,25,27,PO | Performed by: FAMILY MEDICINE

## 2018-11-01 PROCEDURE — 99999 PR PBB SHADOW E&M-EST. PATIENT-LVL IV: CPT | Mod: PBBFAC,,, | Performed by: FAMILY MEDICINE

## 2018-11-01 PROCEDURE — 99213 OFFICE O/P EST LOW 20 MIN: CPT | Mod: PBBFAC,25 | Performed by: OPTOMETRIST

## 2018-11-01 RX ORDER — OMEPRAZOLE 20 MG/1
20 CAPSULE, DELAYED RELEASE ORAL DAILY
Qty: 30 CAPSULE | Refills: 0 | Status: SHIPPED | OUTPATIENT
Start: 2018-11-01 | End: 2019-01-29

## 2018-11-01 RX ORDER — LISINOPRIL 5 MG/1
5 TABLET ORAL DAILY
Qty: 90 TABLET | Refills: 0 | Status: SHIPPED | OUTPATIENT
Start: 2018-11-01 | End: 2018-12-03

## 2018-11-01 NOTE — LETTER
November 1, 2018      Gifty Noe MD  [address] [facility] [address]           [facility]  [address] [place] [id number]  Phone: [phone]  Fax: [phone]          Patient: Ruthann Swenson   MR Number: [id number]   YOB: 1950   Date of Visit: 11/1/2018       Dear Dr. Gifty Noe:    Thank you for referring Ruthann Swenson to me for evaluation. Attached you will find relevant portions of my assessment and plan of care.    If you have questions, please do not hesitate to call me. I look forward to following Ruthann Swenson along with you.    Sincerely,    Immanuel Gallardo, OD    Enclosure  CC:  No Recipients    If you would like to receive this communication electronically, please contact [email or web address] or [phone] to request more information on [employer] access.    For providers and/or their staff who would like to refer a patient to [facility], please contact us through our one-stop-shop provider referral line, [facility], at [phone].    If you feel you have received this communication in error or would no longer like to receive these types of communications, please e-mail [email or web address]

## 2018-11-01 NOTE — PATIENT INSTRUCTIONS
Diabetes mellitus type 2 without retinopathy     Essential hypertension     Hyperopia, bilateral     Bilateral presbyopia        No Background Diabetic Retinopathy     No HTN Retinopathy     OTC readers for glasses.  RTC 1 year  Discussed above and answered questions.

## 2018-11-01 NOTE — PROGRESS NOTES
HPI     NIDDM exam.  Patient wears OTC readers, left glasses today.  Decrease near visual acuity in low light.  New patient last eye exam 8 years ago.        Last edited by Jacey Dawson on 11/1/2018 10:00 AM. (History)            Assessment /Plan     For exam results, see Encounter Report.    Diabetes mellitus type 2 without retinopathy    Essential hypertension    Hyperopia, bilateral    Bilateral presbyopia      No Background Diabetic Retinopathy    No HTN Retinopathy    Dispense Final Rx for glasses.  RTC 1 year  Discussed above and answered questions.

## 2018-11-01 NOTE — PROGRESS NOTES
Subjective:       Patient ID: Ruthann Swenson is a 68 y.o. female.    Chief Complaint: Abdominal Discomfort (upper, middle area)     68 y old female with Vague epigastric  abd pain for 1 w ,Worst after meals. Nagging , constat + Nauseas.  Took IBP which made her sleepy . Also with diarrhea for 1 w, stools have been   dark for the past  5 days  . She just finished a course of Augmentin . Ran a little fever .       Review of Systems   Constitutional: Negative.    HENT: Negative.    Eyes: Negative.    Respiratory: Negative.    Cardiovascular: Negative.    Gastrointestinal: Positive for abdominal pain and diarrhea.   Genitourinary: Negative.    Musculoskeletal: Negative.    Skin: Negative.    Hematological: Negative.        Objective:      Physical Exam   Constitutional: She is oriented to person, place, and time. No distress.   HENT:   Head: Normocephalic and atraumatic.   Right Ear: External ear normal.   Left Ear: External ear normal.   Mouth/Throat: No oropharyngeal exudate.   Eyes: Conjunctivae and EOM are normal. Pupils are equal, round, and reactive to light. Right eye exhibits no discharge. Left eye exhibits no discharge. No scleral icterus.   Neck: Normal range of motion. Neck supple. No JVD present. No tracheal deviation present. No thyromegaly present.   Cardiovascular: Normal rate, regular rhythm and normal heart sounds. Exam reveals no gallop and no friction rub.   No murmur heard.  Pulmonary/Chest: Effort normal and breath sounds normal. No stridor. No respiratory distress. She has no wheezes. She has no rales. She exhibits no tenderness.   Abdominal: Soft. Bowel sounds are normal. She exhibits no distension. There is no tenderness. There is no rebound and no guarding.   Musculoskeletal: Normal range of motion.   Lymphadenopathy:     She has no cervical adenopathy.   Neurological: She is alert and oriented to person, place, and time.   Skin: Skin is warm and dry. She is not diaphoretic.    Psychiatric: She has a normal mood and affect. Her behavior is normal. Judgment and thought content normal.       Assessment:     Ruthann was seen today for abdominal discomfort.    Diagnoses and all orders for this visit:    Diarrhea, unspecified type  -     Occult blood x 1, stool; Future  -     WBC, Stool; Future  -     Stool culture; Future  -     Giardia / Cryptosporidum, EIA; Future  -     Stool Exam-Ova,Cysts,Parasites; Future  -     Rotavirus antigen, stool; Future  -     Clostridium difficile EIA; Future    Epigastric pain  -     CBC auto differential; Future  -     Comprehensive metabolic panel; Future  -     H.Pylori Antibody IgG; Future  -     Urinalysis  -     US Abdomen Limited_Liver; Future    Other orders  -     omeprazole (PRILOSEC) 20 MG capsule; Take 1 capsule (20 mg total) by mouth once daily.      Plan:     We will cont to f.u   High BP . Start Lisinopril . nv in 1 w

## 2018-11-02 ENCOUNTER — TELEPHONE (OUTPATIENT)
Dept: FAMILY MEDICINE | Facility: CLINIC | Age: 68
End: 2018-11-02

## 2018-11-02 DIAGNOSIS — R82.81 PYURIA: Primary | ICD-10-CM

## 2018-11-07 ENCOUNTER — APPOINTMENT (OUTPATIENT)
Dept: RADIOLOGY | Facility: HOSPITAL | Age: 68
End: 2018-11-07
Attending: FAMILY MEDICINE
Payer: MEDICARE

## 2018-11-07 DIAGNOSIS — R10.13 EPIGASTRIC PAIN: ICD-10-CM

## 2018-11-07 PROCEDURE — 76705 ECHO EXAM OF ABDOMEN: CPT | Mod: TC,PO

## 2018-11-07 PROCEDURE — 76705 ECHO EXAM OF ABDOMEN: CPT | Mod: 26,,, | Performed by: RADIOLOGY

## 2018-11-11 ENCOUNTER — PATIENT MESSAGE (OUTPATIENT)
Dept: FAMILY MEDICINE | Facility: CLINIC | Age: 68
End: 2018-11-11

## 2018-11-12 ENCOUNTER — TELEPHONE (OUTPATIENT)
Dept: FAMILY MEDICINE | Facility: CLINIC | Age: 68
End: 2018-11-12

## 2018-11-12 DIAGNOSIS — K76.0 STEATOSIS OF LIVER: Primary | ICD-10-CM

## 2018-11-12 DIAGNOSIS — R30.0 DYSURIA: ICD-10-CM

## 2018-11-13 ENCOUNTER — TELEPHONE (OUTPATIENT)
Dept: FAMILY MEDICINE | Facility: CLINIC | Age: 68
End: 2018-11-13

## 2018-11-13 DIAGNOSIS — R30.0 DYSURIA: Primary | ICD-10-CM

## 2018-11-13 NOTE — TELEPHONE ENCOUNTER
----- Message from Terell Salgado sent at 11/13/2018 11:17 AM CST -----  Contact: pt   Pt trying to return call, pls call back.         ..[phone] (home)

## 2018-11-13 NOTE — TELEPHONE ENCOUNTER
Attempted to contact pt to schedule her an appointment with Urology. No answer, message left for pt to call the office back.

## 2018-11-13 NOTE — TELEPHONE ENCOUNTER
Spoke with pt about an appt with Urology. Urology is booked until 2/26/29. Pt states she will check with her insurance to find a Urologist and will call us back to let us know where to fax a referral. Referral needs to be changed to external please. Please advise.

## 2018-11-14 ENCOUNTER — TELEPHONE (OUTPATIENT)
Dept: UROLOGY | Facility: CLINIC | Age: 68
End: 2018-11-14

## 2018-11-14 ENCOUNTER — PES CALL (OUTPATIENT)
Dept: ADMINISTRATIVE | Facility: CLINIC | Age: 68
End: 2018-11-14

## 2018-11-14 NOTE — TELEPHONE ENCOUNTER
----- Message from Page Guillaume sent at 11/14/2018  8:44 AM CST -----  Contact: self  Patient referred by Dr Garcia to see Urology for Dysuria.   Patient request to see Dr Fischer for tomorrow if possible.   Please call pt at [phone]    FYI:   I was not able to schedule appointment

## 2018-11-16 ENCOUNTER — TELEPHONE (OUTPATIENT)
Dept: FAMILY MEDICINE | Facility: CLINIC | Age: 68
End: 2018-11-16

## 2018-11-16 NOTE — TELEPHONE ENCOUNTER
----- Message from Edna Glover sent at 11/16/2018 11:23 AM CST -----  Contact: Pt  Pt request call back regarding the referral for Neurology    ...[phone] (home)

## 2018-11-20 DIAGNOSIS — E03.9 ACQUIRED HYPOTHYROIDISM: ICD-10-CM

## 2018-11-20 DIAGNOSIS — I10 ESSENTIAL HYPERTENSION: ICD-10-CM

## 2018-11-20 RX ORDER — CARVEDILOL 12.5 MG/1
TABLET ORAL
Qty: 270 TABLET | Refills: 0 | Status: SHIPPED | OUTPATIENT
Start: 2018-11-20 | End: 2019-04-16 | Stop reason: SDUPTHER

## 2018-11-20 RX ORDER — LEVOTHYROXINE SODIUM 150 UG/1
150 TABLET ORAL DAILY
Qty: 90 TABLET | Refills: 0 | Status: SHIPPED | OUTPATIENT
Start: 2018-11-20 | End: 2019-05-20 | Stop reason: SDUPTHER

## 2018-11-30 ENCOUNTER — PES CALL (OUTPATIENT)
Dept: ADMINISTRATIVE | Facility: CLINIC | Age: 68
End: 2018-11-30

## 2018-12-03 ENCOUNTER — OFFICE VISIT (OUTPATIENT)
Dept: FAMILY MEDICINE | Facility: CLINIC | Age: 68
End: 2018-12-03
Payer: MEDICARE

## 2018-12-03 VITALS
SYSTOLIC BLOOD PRESSURE: 136 MMHG | HEIGHT: 62 IN | TEMPERATURE: 98 F | HEART RATE: 75 BPM | OXYGEN SATURATION: 97 % | BODY MASS INDEX: 33.53 KG/M2 | DIASTOLIC BLOOD PRESSURE: 80 MMHG | WEIGHT: 182.19 LBS

## 2018-12-03 DIAGNOSIS — R21 RASH: ICD-10-CM

## 2018-12-03 DIAGNOSIS — R10.2 PELVIC PAIN: Primary | ICD-10-CM

## 2018-12-03 PROCEDURE — 99213 OFFICE O/P EST LOW 20 MIN: CPT | Mod: S$GLB,,, | Performed by: FAMILY MEDICINE

## 2018-12-03 PROCEDURE — 1101F PT FALLS ASSESS-DOCD LE1/YR: CPT | Mod: S$GLB,,, | Performed by: FAMILY MEDICINE

## 2018-12-03 PROCEDURE — 3079F DIAST BP 80-89 MM HG: CPT | Mod: S$GLB,,, | Performed by: FAMILY MEDICINE

## 2018-12-03 PROCEDURE — 3075F SYST BP GE 130 - 139MM HG: CPT | Mod: S$GLB,,, | Performed by: FAMILY MEDICINE

## 2018-12-03 PROCEDURE — 99999 PR PBB SHADOW E&M-EST. PATIENT-LVL III: CPT | Mod: PBBFAC,,, | Performed by: FAMILY MEDICINE

## 2018-12-03 RX ORDER — TRIAMCINOLONE ACETONIDE 1 MG/G
CREAM TOPICAL 2 TIMES DAILY
Qty: 80 G | Refills: 0 | Status: SHIPPED | OUTPATIENT
Start: 2018-12-03

## 2018-12-03 RX ORDER — CLOTRIMAZOLE 1 %
CREAM (GRAM) TOPICAL 2 TIMES DAILY
Qty: 45 G | Refills: 0 | Status: SHIPPED | OUTPATIENT
Start: 2018-12-03

## 2018-12-03 RX ORDER — OXYBUTYNIN CHLORIDE 10 MG/1
1 TABLET, EXTENDED RELEASE ORAL DAILY
COMMUNITY
Start: 2018-11-26 | End: 2019-01-29

## 2018-12-03 NOTE — PROGRESS NOTES
Subjective:       Patient ID: Ruthann Swenson is a 68 y.o. female.    Chief Complaint: Rash (spot under arm)     68 y old female with Rash on r axillae for 3 weeks . pruritic ,no sick contacts.  Using OTC hydrocortisone and jock itch cream  . She has been told that  she has psoriasis by Int med. No skin biopsy       Rash       Review of Systems   Constitutional: Negative.    HENT: Negative.    Eyes: Negative.    Respiratory: Negative.    Cardiovascular: Negative.    Gastrointestinal: Negative.    Genitourinary: Negative.    Musculoskeletal: Negative.    Skin: Positive for rash.   Hematological: Negative.        Objective:      Physical Exam   Constitutional: She is oriented to person, place, and time. She appears well-developed and well-nourished. No distress.   HENT:   Head: Normocephalic and atraumatic.   Right Ear: External ear normal.   Left Ear: External ear normal.   Mouth/Throat: No oropharyngeal exudate.   Eyes: Conjunctivae and EOM are normal. Pupils are equal, round, and reactive to light. Right eye exhibits no discharge. Left eye exhibits no discharge. No scleral icterus.   Neck: Normal range of motion. Neck supple. No JVD present. No tracheal deviation present. No thyromegaly present.   Cardiovascular: Normal rate, regular rhythm and normal heart sounds. Exam reveals no gallop and no friction rub.   No murmur heard.  Pulmonary/Chest: Effort normal and breath sounds normal. No stridor. No respiratory distress. She has no wheezes. She has no rales. She exhibits no tenderness.   Abdominal: Soft. Bowel sounds are normal. She exhibits no distension. There is no tenderness. There is no rebound and no guarding.   Musculoskeletal: Normal range of motion.   Lymphadenopathy:     She has no cervical adenopathy.   Neurological: She is alert and oriented to person, place, and time.   Skin: Skin is warm and dry. She is not diaphoretic.         3 cm in diam erythematous plaque    Psychiatric: She has a normal  mood and affect. Her behavior is normal. Judgment and thought content normal.       Assessment:       Ruthann was seen today for rash.    Diagnoses and all orders for this visit:    Pelvic pain  -     Ambulatory consult to Gynecology    Rash    Other orders  -     triamcinolone acetonide 0.1% (KENALOG) 0.1 % cream; Apply topically 2 (two) times daily.  -     clotrimazole (LOTRIMIN) 1 % cream; Apply topically 2 (two) times daily.      Plan:   Call or return to clinic prn if these symptoms worsen or fail to improve as anticipated.

## 2019-01-11 ENCOUNTER — TELEPHONE (OUTPATIENT)
Dept: FAMILY MEDICINE | Facility: CLINIC | Age: 69
End: 2019-01-11

## 2019-01-11 ENCOUNTER — TELEPHONE (OUTPATIENT)
Dept: DIABETES | Facility: CLINIC | Age: 69
End: 2019-01-11

## 2019-01-11 NOTE — TELEPHONE ENCOUNTER
----- Message from Alejo Crocker sent at 1/11/2019  1:08 PM CST -----  Contact: [employer] expedited appeal and grievance department  Requesting call back regarding why appeal letter was sent after 60 day time limit. The letter needs to be sent by 01/13 or case will be dismissed. Please call back at [phone].    Thanks,  Alejo Crocker

## 2019-01-11 NOTE — TELEPHONE ENCOUNTER
----- Message from Alejo Crocker sent at 1/11/2019  1:05 PM CST -----  Contact: self  Requesting call back regarding getting referral to see OB/GYN and an order to have thyroid ultrasound done. Please call back at [phone].    Thanks,  Alejo Crocker

## 2019-01-15 ENCOUNTER — TELEPHONE (OUTPATIENT)
Dept: OTOLARYNGOLOGY | Facility: CLINIC | Age: 69
End: 2019-01-15

## 2019-01-15 ENCOUNTER — APPOINTMENT (OUTPATIENT)
Dept: RADIOLOGY | Facility: HOSPITAL | Age: 69
End: 2019-01-15
Attending: ORTHOPAEDIC SURGERY
Payer: MEDICARE

## 2019-01-15 DIAGNOSIS — R59.0 CERVICAL LYMPHADENOPATHY: ICD-10-CM

## 2019-01-15 DIAGNOSIS — E04.1 THYROID NODULE: ICD-10-CM

## 2019-01-15 PROCEDURE — 76536 US EXAM OF HEAD AND NECK: CPT | Mod: 26,,, | Performed by: RADIOLOGY

## 2019-01-15 PROCEDURE — 76536 US EXAM OF HEAD AND NECK: CPT | Mod: TC,PO

## 2019-01-15 PROCEDURE — 76536 US SOFT TISSUE HEAD NECK THYROID: ICD-10-PCS | Mod: 26,,, | Performed by: RADIOLOGY

## 2019-01-15 NOTE — TELEPHONE ENCOUNTER
----- Message from Millicent Kovacs MD sent at 1/15/2019  4:44 PM CST -----  Please let Ms. Swenson know that her thyroid nodules have grown very slightly since her last US, but I would recommend an US guided FNA for evaluation.  Order is in computer, please schedule FNA and then see me one week after.  If she has any questions and would like to see me prior to biopsy that is certainly OK as well.

## 2019-01-16 ENCOUNTER — TELEPHONE (OUTPATIENT)
Dept: OTOLARYNGOLOGY | Facility: CLINIC | Age: 69
End: 2019-01-16

## 2019-01-16 NOTE — TELEPHONE ENCOUNTER
I conveyed the message below to the patient who verbalized understanding.  I was able to make her an appointment for u/s guided FNA and a follow up with Dr. Kovacs.  Patient verbalized understanding of date, time and location of both appoitments

## 2019-01-16 NOTE — TELEPHONE ENCOUNTER
----- Message from Sharon Ortega sent at 1/16/2019  8:09 AM CST -----  Contact: self  Pt is returning call from yesterday in regards to ultrasound. Please call pt back at [phone].        Thanks,   Sharon Ortega

## 2019-01-25 ENCOUNTER — HOSPITAL ENCOUNTER (OUTPATIENT)
Dept: RADIOLOGY | Facility: HOSPITAL | Age: 69
Discharge: HOME | End: 2019-01-25
Attending: ORTHOPAEDIC SURGERY
Payer: MEDICARE

## 2019-01-25 DIAGNOSIS — E04.1 THYROID NODULE: ICD-10-CM

## 2019-01-25 PROCEDURE — 88173 CYTOPATH EVAL FNA REPORT: CPT | Performed by: PATHOLOGY

## 2019-01-25 PROCEDURE — 10005 FNA BX W/US GDN 1ST LES: CPT

## 2019-01-25 PROCEDURE — 88173 TISSUE SPECIMEN TO PATHOLOGY, RADIOLOGY: ICD-10-PCS | Mod: 26,,, | Performed by: PATHOLOGY

## 2019-01-25 PROCEDURE — 88173 CYTOPATH EVAL FNA REPORT: CPT | Mod: 26,,, | Performed by: PATHOLOGY

## 2019-01-25 NOTE — H&P
[facility] -   History & Physical    Subjective:      Chief Complaint/Reason for Admission: thyroid nodule    Ruthann Swenson is a 68 y.o. female.    Past Medical History:   Diagnosis Date    Diabetes mellitus 2016    Borderline no medication  pm 10/31/2018    GERD (gastroesophageal reflux disease)     Hypertension     Thyroid disease      No past surgical history on file.  Family History   Problem Relation Age of Onset    Dementia Mother     Cancer Mother         Kidney??    Diabetes Mother     Macular degeneration Mother     Glaucoma Mother     Cataracts Mother     Hypertension Mother     Cancer Father         Lung     Hypertension Father     Heart disease Maternal Aunt         Atheroscelrosis/ chf    Cancer Maternal Aunt         Lung Ca    Cancer Maternal Uncle         Lung Ca    Heart disease Maternal Grandmother      Social History     Tobacco Use    Smoking status: Former Smoker     Packs/day: 0.25     Years: 1.00     Pack years: 0.25    Smokeless tobacco: Never Used   Substance Use Topics    Alcohol use: No    Drug use: No         (Not in a hospital admission)  Review of patient's allergies indicates:   Allergen Reactions    Allegra [fexofenadine]      Increase blood pressure     Lisinopril      Cough     Lortab [hydrocodone-acetaminophen] Swelling    Prednisone Nausea And Vomiting     Pt states it is only the pill form that makes her sick.     Cinnamon analogues Other (See Comments)     cough    Mint Other (See Comments)     cough        Review of Systems   Constitutional: Negative.    HENT: Negative.    Skin: Negative.        Objective:      Vital Signs (Most Recent)       Vital Signs Range (Last 24H):  BP: ()/()   Arterial Line BP: ()/()     Physical Exam   Constitutional: She appears well-nourished.   Eyes: EOM are normal.   Neck: Normal range of motion. Neck supple.       Data Review:    CBC:   Lab Results   Component Value Date    WBC 8.25  11/01/2018    RBC 4.36 11/01/2018    HGB 13.1 11/01/2018    HCT 40.6 11/01/2018     11/01/2018      ECG: no prior ECG.     Assessment:      There are no hospital problems to display for this patient.      Plan:    U/s fna thyroid

## 2019-01-25 NOTE — DISCHARGE SUMMARY
Pre Op Diagnosis: thyroid nodule     Post Op Diagnosis: same     Procedure:  Thyroid fna     Procedure performed by: Padmini CHAIREZ, Glenis CASTRO     Written Informed Consent Obtained: Yes     Specimen Removed:  yes     Estimated Blood Loss:  minimal     Findings: Local anesthesia and moderate sedation were used.     The patient tolerated the procedure well and there were no complications.      Sterile technique was performed in the right neck area, lidocaine was used as a local anesthetic.  Multiple samples taken from the dominant right thyroid nodule.  Pt tolerated the procedure well without immediate complications.  Please see radiologist report for details. F/u with PCP and/or ordering physician.

## 2019-01-29 ENCOUNTER — OFFICE VISIT (OUTPATIENT)
Dept: UROLOGY | Facility: CLINIC | Age: 69
End: 2019-01-29
Payer: MEDICARE

## 2019-01-29 VITALS
HEIGHT: 62 IN | HEART RATE: 82 BPM | BODY MASS INDEX: 33.04 KG/M2 | WEIGHT: 179.56 LBS | SYSTOLIC BLOOD PRESSURE: 122 MMHG | DIASTOLIC BLOOD PRESSURE: 82 MMHG

## 2019-01-29 DIAGNOSIS — R30.0 DYSURIA: Primary | ICD-10-CM

## 2019-01-29 LAB
BILIRUB SERPL-MCNC: NORMAL MG/DL
BLOOD URINE, POC: NORMAL
COLOR, POC UA: NORMAL
GLUCOSE UR QL STRIP: NORMAL
KETONES UR QL STRIP: NORMAL
LEUKOCYTE ESTERASE URINE, POC: NORMAL
NITRITE, POC UA: NORMAL
PH, POC UA: 7
POC RESIDUAL URINE VOLUME: 0 ML (ref 0–100)
PROTEIN, POC: NORMAL
SPECIFIC GRAVITY, POC UA: 1.01
UROBILINOGEN, POC UA: NORMAL

## 2019-01-29 PROCEDURE — 87086 URINE CULTURE/COLONY COUNT: CPT

## 2019-01-29 PROCEDURE — 51798 POCT BLADDER SCAN: ICD-10-PCS | Mod: S$GLB,,, | Performed by: UROLOGY

## 2019-01-29 PROCEDURE — 3074F PR MOST RECENT SYSTOLIC BLOOD PRESSURE < 130 MM HG: ICD-10-PCS | Mod: S$GLB,,, | Performed by: UROLOGY

## 2019-01-29 PROCEDURE — 1101F PT FALLS ASSESS-DOCD LE1/YR: CPT | Mod: S$GLB,,, | Performed by: UROLOGY

## 2019-01-29 PROCEDURE — 3074F SYST BP LT 130 MM HG: CPT | Mod: S$GLB,,, | Performed by: UROLOGY

## 2019-01-29 PROCEDURE — 99999 PR PBB SHADOW E&M-EST. PATIENT-LVL III: ICD-10-PCS | Mod: PBBFAC,,, | Performed by: UROLOGY

## 2019-01-29 PROCEDURE — 3079F DIAST BP 80-89 MM HG: CPT | Mod: S$GLB,,, | Performed by: UROLOGY

## 2019-01-29 PROCEDURE — 99204 OFFICE O/P NEW MOD 45 MIN: CPT | Mod: 25,S$GLB,, | Performed by: UROLOGY

## 2019-01-29 PROCEDURE — 99999 PR PBB SHADOW E&M-EST. PATIENT-LVL III: CPT | Mod: PBBFAC,,, | Performed by: UROLOGY

## 2019-01-29 PROCEDURE — 81002 POCT URINE DIPSTICK WITHOUT MICROSCOPE: ICD-10-PCS | Mod: S$GLB,,, | Performed by: UROLOGY

## 2019-01-29 PROCEDURE — 81002 URINALYSIS NONAUTO W/O SCOPE: CPT | Mod: S$GLB,,, | Performed by: UROLOGY

## 2019-01-29 PROCEDURE — 1101F PR PT FALLS ASSESS DOC 0-1 FALLS W/OUT INJ PAST YR: ICD-10-PCS | Mod: S$GLB,,, | Performed by: UROLOGY

## 2019-01-29 PROCEDURE — 3079F PR MOST RECENT DIASTOLIC BLOOD PRESSURE 80-89 MM HG: ICD-10-PCS | Mod: S$GLB,,, | Performed by: UROLOGY

## 2019-01-29 PROCEDURE — 81001 URINALYSIS AUTO W/SCOPE: CPT

## 2019-01-29 PROCEDURE — 51798 US URINE CAPACITY MEASURE: CPT | Mod: S$GLB,,, | Performed by: UROLOGY

## 2019-01-29 PROCEDURE — 99204 PR OFFICE/OUTPT VISIT, NEW, LEVL IV, 45-59 MIN: ICD-10-PCS | Mod: 25,S$GLB,, | Performed by: UROLOGY

## 2019-01-29 NOTE — PROGRESS NOTES
Chief Complaint: Dysuria    HPI:   1/29/19: 67 yo woman referred for dysuria.  Has SP pressure and discomfort around to the back.  Says she has a lot of UTI; odor and pain low midline back.  Says she catches it by going to the doctor; UA is checked and she gets Abx then and a UCx is sent. Last dx UTI in 12/18.  Before that 7-8 times a year over the last three years. Poor evidence in chart for recurrent UTI.  Passes a stone in early 90's none since.  Has SP dixcomfort but no other abd/pelvic pain and no exac/rel factors.  No hematuria.  No recent urolithiasis.  No urinary bother.  Saw a urologist at [facility] and was referred to an OB for an enlarged uterus.  Referred by Dr. Garcia.  KUB normal 4/18.    Allergies:  Allegra [fexofenadine]; Lisinopril; Lortab [hydrocodone-acetaminophen]; Prednisone; Cinnamon analogues; and Mint    Medications: has a current medication list which includes the following prescription(s): albuterol, carvedilol, cetirizine, clotrimazole, fish oil-omega-3 fatty acids, fluticasone, levothyroxine, multivitamin, omeprazole, oxybutynin, triamcinolone acetonide 0.1%, and turmeric root extract.    Review of Systems:  General: No fever, chills, fatigability, or weight loss.  Skin: No rashes, itching, or changes in color or texture of skin.  Chest: Denies STALEY, cyanosis, wheezing, cough, and sputum production.  Abdomen: Appetite fine. No weight loss. Denies diarrhea, abdominal pain, hematemesis, or blood in stool.  Musculoskeletal: No joint stiffness or swelling. Denies back pain.  : As above.  All other review of systems negative.    PMH:   has a past medical history of Diabetes mellitus (2016), GERD (gastroesophageal reflux disease), Hypertension, and Thyroid disease.    PSH:   has no past surgical history on file.    FamHx: family history includes Cancer in her father, maternal aunt, maternal uncle, and mother; Cataracts in her mother; Dementia in her mother; Diabetes in her mother; Glaucoma in her  mother; Heart disease in her maternal aunt and maternal grandmother; Hypertension in her father and mother; Macular degeneration in her mother.    SocHx:  reports that she has quit smoking. She has a 0.25 pack-year smoking history. she has never used smokeless tobacco. She reports that she does not drink alcohol or use drugs.     Physical Exam:  Vitals:   Vitals:    01/29/19 1555   BP: 122/82   Pulse: 82     General: A&Ox3. No apparent distress. No deformities.  Neck: No masses. Normal thyroid.  Lungs: normal inspiration. No use of accessory muscles.  Heart: normal pulse. No arrhythmias.  Abdomen: Soft. NT. ND. No masses. No hernias. No hepatosplenomegaly.  Lymphatic: Neck and groin nodes negative.  Skin: The skin is warm and dry. No jaundice.  Ext: No c/c/e.  : External genitalia normal.     Labs/Studies:     Impression/Plan:   1. No recent microhematuria on UA, will recheck again today with cath UA/UCx.    2. Nurse visits for cath UA/UCx when symptomatic to build a history  3. CT RSS to ensure no stone problems as cause of symptoms

## 2019-01-29 NOTE — LETTER
January 29, 2019      Gifty Noe MD  [address] [facility] [address]           [facility] - Urology  [address] [place] [id number]  Phone: [phone]  Fax: [phone]          Patient: Ruthann Swenson   MR Number: [id number]   YOB: 1950   Date of Visit: 1/29/2019       Dear Dr. Gifty Noe:    Thank you for referring Ruthann Swenson to me for evaluation. Attached you will find relevant portions of my assessment and plan of care.    If you have questions, please do not hesitate to call me. I look forward to following Ruthann Swenson along with you.    Sincerely,    Fantasma Mcghee IV, MD    Enclosure  CC:  No Recipients    If you would like to receive this communication electronically, please contact [email or web address] or [phone] to request more information on [employer] Link access.    For providers and/or their staff who would like to refer a patient to [facility], please contact us through our one-stop-shop provider referral line, [facility], at [phone].    If you feel you have received this communication in error or would no longer like to receive these types of communications, please e-mail [email or web address]

## 2019-01-29 NOTE — PROGRESS NOTES
Cath u/a and c/s ordered per Dr. Mcghee. Using sterile technique, inserted pediatric catheter into bladder and obtained 10ml of clear, yellow urine. Patient tolerated well. Specimen sent to lab.

## 2019-01-30 LAB
AMORPH CRY UR QL COMP ASSIST: ABNORMAL
BILIRUB UR QL STRIP: NEGATIVE
CLARITY UR REFRACT.AUTO: ABNORMAL
COLOR UR AUTO: ABNORMAL
GLUCOSE UR QL STRIP: NEGATIVE
HGB UR QL STRIP: NEGATIVE
KETONES UR QL STRIP: ABNORMAL
LEUKOCYTE ESTERASE UR QL STRIP: NEGATIVE
MICROSCOPIC COMMENT: ABNORMAL
NITRITE UR QL STRIP: NEGATIVE
PH UR STRIP: 7 [PH] (ref 5–8)
PROT UR QL STRIP: NEGATIVE
RBC #/AREA URNS AUTO: 2 /HPF (ref 0–4)
SP GR UR STRIP: 1.02 (ref 1–1.03)
URN SPEC COLLECT METH UR: ABNORMAL

## 2019-01-31 LAB — BACTERIA UR CULT: NO GROWTH

## 2019-02-01 ENCOUNTER — OFFICE VISIT (OUTPATIENT)
Dept: OBSTETRICS AND GYNECOLOGY | Facility: CLINIC | Age: 69
End: 2019-02-01
Attending: FAMILY MEDICINE
Payer: MEDICARE

## 2019-02-01 VITALS
BODY MASS INDEX: 33.1 KG/M2 | WEIGHT: 179.88 LBS | DIASTOLIC BLOOD PRESSURE: 56 MMHG | SYSTOLIC BLOOD PRESSURE: 118 MMHG | HEIGHT: 62 IN

## 2019-02-01 DIAGNOSIS — Z01.419 ENCOUNTER FOR GYNECOLOGICAL EXAMINATION WITHOUT ABNORMAL FINDING: ICD-10-CM

## 2019-02-01 DIAGNOSIS — R10.2 PELVIC PAIN: Primary | ICD-10-CM

## 2019-02-01 PROCEDURE — 3074F PR MOST RECENT SYSTOLIC BLOOD PRESSURE < 130 MM HG: ICD-10-PCS | Mod: S$GLB,,, | Performed by: OBSTETRICS & GYNECOLOGY

## 2019-02-01 PROCEDURE — 99204 PR OFFICE/OUTPT VISIT, NEW, LEVL IV, 45-59 MIN: ICD-10-PCS | Mod: S$GLB,,, | Performed by: OBSTETRICS & GYNECOLOGY

## 2019-02-01 PROCEDURE — 3078F PR MOST RECENT DIASTOLIC BLOOD PRESSURE < 80 MM HG: ICD-10-PCS | Mod: S$GLB,,, | Performed by: OBSTETRICS & GYNECOLOGY

## 2019-02-01 PROCEDURE — 88175 CYTOPATH C/V AUTO FLUID REDO: CPT

## 2019-02-01 PROCEDURE — 99204 OFFICE O/P NEW MOD 45 MIN: CPT | Mod: S$GLB,,, | Performed by: OBSTETRICS & GYNECOLOGY

## 2019-02-01 PROCEDURE — 1101F PT FALLS ASSESS-DOCD LE1/YR: CPT | Mod: S$GLB,,, | Performed by: OBSTETRICS & GYNECOLOGY

## 2019-02-01 PROCEDURE — 1101F PR PT FALLS ASSESS DOC 0-1 FALLS W/OUT INJ PAST YR: ICD-10-PCS | Mod: S$GLB,,, | Performed by: OBSTETRICS & GYNECOLOGY

## 2019-02-01 PROCEDURE — 87624 HPV HI-RISK TYP POOLED RSLT: CPT

## 2019-02-01 PROCEDURE — 3074F SYST BP LT 130 MM HG: CPT | Mod: S$GLB,,, | Performed by: OBSTETRICS & GYNECOLOGY

## 2019-02-01 PROCEDURE — 99999 PR PBB SHADOW E&M-EST. PATIENT-LVL III: ICD-10-PCS | Mod: PBBFAC,,, | Performed by: OBSTETRICS & GYNECOLOGY

## 2019-02-01 PROCEDURE — 99999 PR PBB SHADOW E&M-EST. PATIENT-LVL III: CPT | Mod: PBBFAC,,, | Performed by: OBSTETRICS & GYNECOLOGY

## 2019-02-01 PROCEDURE — 3078F DIAST BP <80 MM HG: CPT | Mod: S$GLB,,, | Performed by: OBSTETRICS & GYNECOLOGY

## 2019-02-01 NOTE — PROGRESS NOTES
Subjective:       Patient ID: Ruthann Swenson is a 68 y.o. female.    Chief Complaint:  Pelvic Pain      History of Present Illness    HPI  Referral from  for vague bilateral pelvic pain with negative  work up, CT pending  Was informed by previous  that uterus was enlarge.   Postmenopausal for many years on no HRT   Denies vaginal discharge or any bleeding   Pain not associated with any specific activity  Note an increase in walking exercise recently   NO pap on record.    Health Maintenance   Topic Date Due    Zoster Vaccine  2012    Influenza Vaccine  2018    Hemoglobin A1c  2019    Foot Exam  2019    Urine Microalbumin  2019    Mammogram  2019    Lipid Panel  10/19/2019    Eye Exam  2019    DEXA SCAN  2021    Pneumococcal Vaccine (65+ Low/Medium Risk) (2 of 2 - PPSV23) 2021    Colonoscopy  2023    TETANUS VACCINE  2026    Hepatitis C Screening  Completed     GYN & OB History  No LMP recorded. Patient is postmenopausal.   Date of Last Pap: No result found    OB History    Para Term  AB Living   3 3 3         SAB TAB Ectopic Multiple Live Births                  # Outcome Date GA Lbr Kennedy/2nd Weight Sex Delivery Anes PTL Lv   3 Term            2 Term            1 Term                   Review of Systems  Review of Systems   Constitutional: Negative for activity change, appetite change, chills, fatigue, fever and unexpected weight change.   HENT: Negative for mouth sores.    Eyes: Negative for visual disturbance.   Respiratory: Negative for cough and shortness of breath.    Cardiovascular: Negative for chest pain and palpitations.   Gastrointestinal: Negative for abdominal pain, bloating, blood in stool, constipation, diarrhea and nausea.   Genitourinary: Positive for pelvic pain. Negative for decreased libido, dyspareunia, dysuria, frequency, genital sores, hematuria, urgency, vaginal discharge, urinary  incontinence, postcoital bleeding, postmenopausal bleeding and vaginal odor.   Musculoskeletal: Negative for back pain, joint swelling and myalgias.   Neurological: Negative for syncope, numbness and headaches.   Hematological: Negative for adenopathy. Does not bruise/bleed easily.   Psychiatric/Behavioral: Negative for depression and sleep disturbance. The patient is not nervous/anxious.    Breast: Negative for mass, mastodynia, nipple discharge and skin changes          Objective:   Physical Exam:             Abdominal: Soft. Bowel sounds are normal. She exhibits no distension, no mass and no abdominal incision. There is no tenderness. There is no guarding. No hernia. Hernia confirmed negative in the right inguinal area and confirmed negative in the left inguinal area.     Genitourinary: Rectum normal, vagina normal and uterus normal. There is no rash, tenderness or lesion on the right labia. There is no rash, tenderness or lesion on the left labia. Uterus is not deviated, not enlarged, not tender, not hosting fibroids and not experiencing uterine prolapse. Cervix is normal. Right adnexum displays no mass, no tenderness and no fullness. Left adnexum displays no mass, no tenderness and no fullness. No tenderness, bleeding, rectocele, cystocele or unspecified prolapse of vaginal walls in the vagina. No foreign body in the vagina. No signs of injury around the vagina. No vaginal discharge found. Cervix exhibits no motion tenderness, no discharge and no friability. Additional cervical findings: pap smear done             Lymphadenopathy:        Right: No inguinal adenopathy present.        Left: No inguinal adenopathy present.         There is tenderness bilaterally on palpation of the pelvic wall musculature   Assessment:        1. Pelvic pain    2. Encounter for gynecological examination without abnormal finding       Suspect pelvic muscular soreness as etiology          Plan:            Ruthann was seen today for  pelvic pain.    Diagnoses and all orders for this visit:    Pelvic pain  -     Liquid-based pap smear, screening  -     HPV High Risk Genotypes, PCR    Encounter for gynecological examination without abnormal finding    consider physical therapy if CT normal

## 2019-02-01 NOTE — LETTER
February 1, 2019      Gifty Noe MD  [address] [facility] [address]           [facility]  [address] [place] [id number]  Phone: [phone]  Fax: [phone]          Patient: Ruthann Swenson   MR Number: [id number]   YOB: 1950   Date of Visit: 2/1/2019       Dear Dr. Gifty Noe:    Thank you for referring Ruthann Swenson to me for evaluation. Attached you will find relevant portions of my assessment and plan of care.    If you have questions, please do not hesitate to call me. I look forward to following Ruthann Swenson along with you.    Sincerely,    DEBBY Galdamez MD    Enclosure  CC:  No Recipients    If you would like to receive this communication electronically, please contact [email or web address] or [phone] to request more information on [employer] Link access.    For providers and/or their staff who would like to refer a patient to [facility], please contact us through our one-stop-shop provider referral line, [facility], at [phone].    If you feel you have received this communication in error or would no longer like to receive these types of communications, please e-mail [email or web address]

## 2019-02-07 LAB
HPV HR 12 DNA CVX QL NAA+PROBE: NEGATIVE
HPV16 AG SPEC QL: NEGATIVE
HPV18 DNA SPEC QL NAA+PROBE: NEGATIVE

## 2019-04-16 DIAGNOSIS — I10 ESSENTIAL HYPERTENSION: ICD-10-CM

## 2019-04-17 RX ORDER — CARVEDILOL 12.5 MG/1
TABLET ORAL
Qty: 270 TABLET | Refills: 0 | Status: SHIPPED | OUTPATIENT
Start: 2019-04-17 | End: 2019-05-20

## 2019-04-23 ENCOUNTER — PATIENT OUTREACH (OUTPATIENT)
Dept: ADMINISTRATIVE | Facility: HOSPITAL | Age: 69
End: 2019-04-23

## 2019-04-23 NOTE — PROGRESS NOTES
"Pt had booked [facility] f/u appt and had stated "don't know" in the note section. Pt is returning for F/U DM needs labs as well. I was attempting to contact pt to let her know that she is coming in for a Diabetic f/u with labs.  "

## 2019-05-16 ENCOUNTER — PATIENT OUTREACH (OUTPATIENT)
Dept: ADMINISTRATIVE | Facility: HOSPITAL | Age: 69
End: 2019-05-16

## 2019-05-16 NOTE — PROGRESS NOTES
Attempting to contact pt to schedule over due HM:DM & needed labs with F/U. Unable to reach patient at this time. Left voicemail.

## 2019-05-20 ENCOUNTER — OFFICE VISIT (OUTPATIENT)
Dept: FAMILY MEDICINE | Facility: CLINIC | Age: 69
End: 2019-05-20
Attending: FAMILY MEDICINE
Payer: MEDICARE

## 2019-05-20 VITALS
TEMPERATURE: 99 F | SYSTOLIC BLOOD PRESSURE: 160 MMHG | WEIGHT: 184.06 LBS | DIASTOLIC BLOOD PRESSURE: 78 MMHG | OXYGEN SATURATION: 95 % | BODY MASS INDEX: 33.87 KG/M2 | HEIGHT: 62 IN | HEART RATE: 86 BPM

## 2019-05-20 DIAGNOSIS — J02.9 SORE THROAT: ICD-10-CM

## 2019-05-20 DIAGNOSIS — I10 HYPERTENSION, UNSPECIFIED TYPE: ICD-10-CM

## 2019-05-20 DIAGNOSIS — E03.9 ACQUIRED HYPOTHYROIDISM: ICD-10-CM

## 2019-05-20 DIAGNOSIS — E11.69 DM TYPE 2 WITH DIABETIC DYSLIPIDEMIA: Primary | ICD-10-CM

## 2019-05-20 DIAGNOSIS — E78.5 DM TYPE 2 WITH DIABETIC DYSLIPIDEMIA: Primary | ICD-10-CM

## 2019-05-20 LAB
CTP QC/QA: YES
S PYO RRNA THROAT QL PROBE: NEGATIVE

## 2019-05-20 PROCEDURE — 1101F PT FALLS ASSESS-DOCD LE1/YR: CPT | Mod: S$GLB,,, | Performed by: FAMILY MEDICINE

## 2019-05-20 PROCEDURE — 87880 STREP A ASSAY W/OPTIC: CPT | Mod: QW,S$GLB,, | Performed by: FAMILY MEDICINE

## 2019-05-20 PROCEDURE — 3078F PR MOST RECENT DIASTOLIC BLOOD PRESSURE < 80 MM HG: ICD-10-PCS | Mod: S$GLB,,, | Performed by: FAMILY MEDICINE

## 2019-05-20 PROCEDURE — 3077F PR MOST RECENT SYSTOLIC BLOOD PRESSURE >= 140 MM HG: ICD-10-PCS | Mod: S$GLB,,, | Performed by: FAMILY MEDICINE

## 2019-05-20 PROCEDURE — 1101F PR PT FALLS ASSESS DOC 0-1 FALLS W/OUT INJ PAST YR: ICD-10-PCS | Mod: S$GLB,,, | Performed by: FAMILY MEDICINE

## 2019-05-20 PROCEDURE — 3077F SYST BP >= 140 MM HG: CPT | Mod: S$GLB,,, | Performed by: FAMILY MEDICINE

## 2019-05-20 PROCEDURE — 3078F DIAST BP <80 MM HG: CPT | Mod: S$GLB,,, | Performed by: FAMILY MEDICINE

## 2019-05-20 PROCEDURE — 3045F PR MOST RECENT HEMOGLOBIN A1C LEVEL 7.0-9.0%: ICD-10-PCS | Mod: S$GLB,,, | Performed by: FAMILY MEDICINE

## 2019-05-20 PROCEDURE — 3045F PR MOST RECENT HEMOGLOBIN A1C LEVEL 7.0-9.0%: CPT | Mod: S$GLB,,, | Performed by: FAMILY MEDICINE

## 2019-05-20 PROCEDURE — 99999 PR PBB SHADOW E&M-EST. PATIENT-LVL III: ICD-10-PCS | Mod: PBBFAC,,, | Performed by: FAMILY MEDICINE

## 2019-05-20 PROCEDURE — 99214 PR OFFICE/OUTPT VISIT, EST, LEVL IV, 30-39 MIN: ICD-10-PCS | Mod: 25,S$GLB,, | Performed by: FAMILY MEDICINE

## 2019-05-20 PROCEDURE — 99214 OFFICE O/P EST MOD 30 MIN: CPT | Mod: 25,S$GLB,, | Performed by: FAMILY MEDICINE

## 2019-05-20 PROCEDURE — 87880 POCT RAPID STREP A: ICD-10-PCS | Mod: QW,S$GLB,, | Performed by: FAMILY MEDICINE

## 2019-05-20 PROCEDURE — 99999 PR PBB SHADOW E&M-EST. PATIENT-LVL III: CPT | Mod: PBBFAC,,, | Performed by: FAMILY MEDICINE

## 2019-05-20 RX ORDER — LOSARTAN POTASSIUM 50 MG/1
50 TABLET ORAL DAILY
Qty: 30 TABLET | Refills: 0 | Status: SHIPPED | OUTPATIENT
Start: 2019-05-20 | End: 2020-05-19

## 2019-05-20 RX ORDER — AMOXICILLIN AND CLAVULANATE POTASSIUM 875; 125 MG/1; MG/1
1 TABLET, FILM COATED ORAL 2 TIMES DAILY
Qty: 20 TABLET | Refills: 0 | Status: SHIPPED | OUTPATIENT
Start: 2019-05-20 | End: 2019-05-30

## 2019-05-20 RX ORDER — LEVOTHYROXINE SODIUM 150 UG/1
150 TABLET ORAL DAILY
Qty: 90 TABLET | Refills: 0 | Status: SHIPPED | OUTPATIENT
Start: 2019-05-20

## 2019-05-20 NOTE — PROGRESS NOTES
Subjective:       Patient ID: Ruthann Swenson is a 68 y.o. female.    Chief Complaint: Cough and Sore Throat    68 y old female  here for f/u on DM, HTN , dlp and hypothyroidism . Not On aspirin,it upsets her stomach , needs 3rd  hep B   vacc . exercises :  walking daily , Opthalmology : current eye exam . , Fastin bs are : 78  and  2 hrs pp : 140. No constipation , dry skin , depression  . Also with ST , nasal congestion and non productive cough for 4 days . Taking zyrtec and using flonase, Metoprolol has lowered her pulse tp 50 ans she feels weak after taking it     Review of Systems   Constitutional: Negative.    HENT: Positive for sore throat.    Eyes: Negative.    Respiratory: Positive for cough. Negative for shortness of breath.    Cardiovascular: Negative.    Gastrointestinal: Negative.    Genitourinary: Negative.    Musculoskeletal: Negative.    Skin: Negative.    Hematological: Negative.        Objective:      Physical Exam   Constitutional: She is oriented to person, place, and time. No distress.   HENT:   Head: Normocephalic and atraumatic.   Right Ear: External ear normal.   Left Ear: External ear normal.   Nose: Mucosal edema and rhinorrhea present.   Mouth/Throat: No oropharyngeal exudate.   Eyes: Pupils are equal, round, and reactive to light. Conjunctivae and EOM are normal. Right eye exhibits no discharge. Left eye exhibits no discharge. No scleral icterus.   Neck: Normal range of motion. Neck supple. No JVD present. No tracheal deviation present. No thyromegaly present.   Cardiovascular: Normal rate, regular rhythm and normal heart sounds. Exam reveals no gallop and no friction rub.   No murmur heard.  Pulses:       Dorsalis pedis pulses are 2+ on the right side, and 2+ on the left side.        Posterior tibial pulses are 2+ on the right side, and 2+ on the left side.   Pulmonary/Chest: Effort normal and breath sounds normal. No stridor. No respiratory distress. She has no wheezes. She  has no rales. She exhibits no tenderness.   Abdominal: Soft. Bowel sounds are normal. She exhibits no distension. There is no tenderness. There is no rebound and no guarding.   Musculoskeletal: Normal range of motion.        Right foot: There is normal range of motion and no deformity.        Left foot: There is normal range of motion and no deformity.   Feet:   Right Foot:   Protective Sensation: 10 sites tested. 10 sites sensed.   Skin Integrity: Negative for ulcer, blister, skin breakdown, erythema, warmth, callus or dry skin.   Left Foot:   Protective Sensation: 10 sites tested. 10 sites sensed.   Skin Integrity: Negative for ulcer, blister, skin breakdown, erythema, warmth, callus or dry skin.   Lymphadenopathy:     She has no cervical adenopathy.   Neurological: She is alert and oriented to person, place, and time.   Skin: Skin is warm and dry. She is not diaphoretic.   Psychiatric: She has a normal mood and affect. Her behavior is normal. Judgment and thought content normal.       Assessment:       Ruthann was seen today for cough and sore throat.    Diagnoses and all orders for this visit:    DM type 2 with diabetic dyslipidemia  -     CBC auto differential; Future  -     Comprehensive metabolic panel; Future  -     Hemoglobin A1c; Future  -     Lipid panel; Future  -     Microalbumin/creatinine urine ratio; Future    Acquired hypothyroidism  -     levothyroxine (SYNTHROID) 150 MCG tablet; Take 1 tablet (150 mcg total) by mouth once daily.  -     CBC auto differential; Future  -     Comprehensive metabolic panel; Future  -     Hemoglobin A1c; Future  -     Lipid panel; Future  -     Microalbumin/creatinine urine ratio; Future    Hypertension, unspecified type  -     CBC auto differential; Future  -     Comprehensive metabolic panel; Future  -     Hemoglobin A1c; Future  -     Lipid panel; Future  -     Microalbumin/creatinine urine ratio; Future    Sore throat  -     POCT Rapid Strep A    Other orders  -      losartan (COZAAR) 50 MG tablet; Take 1 tablet (50 mg total) by mouth once daily.  -     amoxicillin-clavulanate 875-125mg (AUGMENTIN) 875-125 mg per tablet; Take 1 tablet by mouth 2 (two) times daily. for 10 days      Plan:     Ruthann was seen today for cough and sore throat.    Diagnoses and all orders for this visit:    Acquired hypothyroidism  -     levothyroxine (SYNTHROID) 150 MCG tablet; Take 1 tablet (150 mcg total) by mouth once daily.    Pt. encouraged to follow a strict diabetic type diet.   Encouraged daily physical activity/exercise for at least 30mins if tolerated.   Weight control recommenced as always.   Discussed how lifestyle changes make biggest impact on diabetes control.   Continue home glucose monitoring once daily and keep log.   Counseling provided today about hypoglycemia as well as about how diabetes increases risk of cardiovascular, cerebrovascular ,peripheral vascular disease and other serious health complications. He has been instructed to call if developing any new symptoms or hypoglycemia related symptoms.   See encounter for associated orders/medications.   - Lipid panel; Future  Controlled/ cont meds   Uncontrolled.  She has not taken metoprolol today . Will Stop metoprolol . Start Losartan . N.v in 1 w     Call or return to clinic prn if these symptoms worsen or fail to improve as anticipated.  Pending hep b vacc

## 2019-05-28 ENCOUNTER — PATIENT MESSAGE (OUTPATIENT)
Dept: FAMILY MEDICINE | Facility: CLINIC | Age: 69
End: 2019-05-28

## 2019-05-28 ENCOUNTER — CLINICAL SUPPORT (OUTPATIENT)
Dept: FAMILY MEDICINE | Facility: CLINIC | Age: 69
End: 2019-05-28
Payer: MEDICARE

## 2019-05-28 ENCOUNTER — TELEPHONE (OUTPATIENT)
Dept: FAMILY MEDICINE | Facility: CLINIC | Age: 69
End: 2019-05-28

## 2019-05-28 VITALS — DIASTOLIC BLOOD PRESSURE: 77 MMHG | HEART RATE: 83 BPM | SYSTOLIC BLOOD PRESSURE: 131 MMHG

## 2019-05-28 NOTE — TELEPHONE ENCOUNTER
Spoke to pt about increasing her Losartan to 75 mg qd. Pt verbalized understanding and states she is going to keep a log of her BP readings and the times that she takes the BP medication and states she will bring it to the office in a week to be reviewed. Informed pt I would make sure this is okay with Dr. Garcia.

## 2019-05-28 NOTE — TELEPHONE ENCOUNTER
----- Message from Birgit Mayfield sent at 5/28/2019  2:49 PM CDT -----  Contact: [phone]  Would like to consult with the nurse, patient declined to leave a message , please call back at [phone], thanks sj

## 2019-05-28 NOTE — TELEPHONE ENCOUNTER
Spoke with pt, she states that she wants to be taken off of Losartan and wants to have a new medication called in. She states that the Losartan makes her BP go up and she states that coughing is a side effect of Losartan.

## 2019-05-28 NOTE — PROGRESS NOTES
Pt here for bp check. Reading was 131/77 and pulse of 83. Pt states that she does not think the losartan is working for her. Her bp ahsan to 210/180 on Saturday evening. She was dizzy and had a bad headache. Pt stated that she did not go to the ER for eval and tx. She stated that her bp on Sunday was 184/93. She also stated that she has been dizzy. She took an extra 25 mg of losartan last night. She states that is why her bp was so good this morning. She said that she still had a headache on the top of her head. Pt states that she just got a new bp cuff in March. She states that she lays down quietly for at least 15 minutes before she checks her bp.

## 2019-05-28 NOTE — TELEPHONE ENCOUNTER
Informed pt Dr. Garcia would like her to come back in for a BP check to compare BP machines and manual BP. Pt states she took her BP on her machine and her neighbors machine and they bother read the same. Pt states it has to do with the pill and not the BP machine.

## 2019-05-28 NOTE — TELEPHONE ENCOUNTER
Spoke to pt and informed pt of Dr. Garcia's advise and recommendation. Pt states she has been taking BP medication for over 10 years and has never had BP as high as it was until she started taking the Losartan. Pt states she will go to urgent care and get a different BP medication, said thank you and disconnected the call.

## 2019-05-28 NOTE — TELEPHONE ENCOUNTER
Chronic cough is not caused by losartan . Also , BP was good in clinic today . She has to give it time . If it has not improved next week , please schedule  MD cheryle

## 2019-05-28 NOTE — TELEPHONE ENCOUNTER
----- Message from Heidi Dawson sent at 5/28/2019 12:04 PM CDT -----  Contact: pt  Type:  Patient Returning Call    Who Called: the pt   Who Left Message for Patient: Martha  Does the patient know what this is regarding?: no  Would the patient rather a call back or a response via [email or web address]? Call back  Best Call Back Number: [phone]  Additional Information: n/a

## 2019-05-28 NOTE — TELEPHONE ENCOUNTER
----- Message from Jaci Jha LPN sent at 5/28/2019  8:53 AM CDT -----  Pt here for bp check. Reading was 131/77 and pulse of 83. Pt states that she does not think the losartan is working for her. Her bp ahsan to 210/180 on Saturday evening. She was dizzy and had a bad headache. Pt stated that she did not go to the ER for eval and tx. She stated that her bp on Sunday was 184/93. She also stated that she has been dizzy. She took an extra 25 mg of losartan last night. She states that is why her bp was so good this morning. She said that she still had a headache on the top of her head. Pt states that she just got a new bp cuff in March. She states that she lays down quietly for at least 15 minutes before she checks her bp.

## 2019-09-06 ENCOUNTER — PATIENT OUTREACH (OUTPATIENT)
Dept: ADMINISTRATIVE | Facility: HOSPITAL | Age: 69
End: 2019-09-06